# Patient Record
Sex: FEMALE | Race: BLACK OR AFRICAN AMERICAN | NOT HISPANIC OR LATINO | Employment: FULL TIME | URBAN - METROPOLITAN AREA
[De-identification: names, ages, dates, MRNs, and addresses within clinical notes are randomized per-mention and may not be internally consistent; named-entity substitution may affect disease eponyms.]

---

## 2017-07-28 ENCOUNTER — OFFICE VISIT - HEALTHEAST (OUTPATIENT)
Dept: FAMILY MEDICINE | Facility: CLINIC | Age: 27
End: 2017-07-28

## 2017-07-28 ENCOUNTER — COMMUNICATION - HEALTHEAST (OUTPATIENT)
Dept: TELEHEALTH | Facility: CLINIC | Age: 27
End: 2017-07-28

## 2017-07-28 DIAGNOSIS — Z76.89 ENCOUNTER TO ESTABLISH CARE: ICD-10-CM

## 2017-07-28 DIAGNOSIS — E66.3 OVERWEIGHT (BMI 25.0-29.9): ICD-10-CM

## 2017-07-28 DIAGNOSIS — L65.9 HAIR LOSS: ICD-10-CM

## 2017-07-28 DIAGNOSIS — D50.9 IRON DEFICIENCY ANEMIA: ICD-10-CM

## 2017-07-28 DIAGNOSIS — N92.1 MENORRHAGIA WITH IRREGULAR CYCLE: ICD-10-CM

## 2017-07-28 DIAGNOSIS — Z00.00 ANNUAL PHYSICAL EXAM: ICD-10-CM

## 2017-07-28 ASSESSMENT — MIFFLIN-ST. JEOR: SCORE: 1370.27

## 2017-08-01 ENCOUNTER — COMMUNICATION - HEALTHEAST (OUTPATIENT)
Dept: FAMILY MEDICINE | Facility: CLINIC | Age: 27
End: 2017-08-01

## 2017-08-02 ENCOUNTER — COMMUNICATION - HEALTHEAST (OUTPATIENT)
Dept: SCHEDULING | Facility: CLINIC | Age: 27
End: 2017-08-02

## 2017-08-24 ENCOUNTER — COMMUNICATION - HEALTHEAST (OUTPATIENT)
Dept: FAMILY MEDICINE | Facility: CLINIC | Age: 27
End: 2017-08-24

## 2017-08-31 ENCOUNTER — OFFICE VISIT - HEALTHEAST (OUTPATIENT)
Dept: FAMILY MEDICINE | Facility: CLINIC | Age: 27
End: 2017-08-31

## 2017-08-31 DIAGNOSIS — E66.3 OVERWEIGHT (BMI 25.0-29.9): ICD-10-CM

## 2017-08-31 DIAGNOSIS — Z3A.09 9 WEEKS GESTATION OF PREGNANCY: ICD-10-CM

## 2017-08-31 ASSESSMENT — MIFFLIN-ST. JEOR: SCORE: 1381.89

## 2018-02-05 ENCOUNTER — RECORDS - HEALTHEAST (OUTPATIENT)
Dept: ADMINISTRATIVE | Facility: OTHER | Age: 28
End: 2018-02-05

## 2018-02-10 ENCOUNTER — HOSPITAL ENCOUNTER (OUTPATIENT)
Dept: MEDSURG UNIT | Facility: CLINIC | Age: 28
Discharge: HOME | End: 2018-02-10
Attending: OBSTETRICS & GYNECOLOGY | Admitting: OBSTETRICS & GYNECOLOGY

## 2018-02-10 LAB
ALBUMIN UR-MCNC: NEGATIVE MG/DL
APPEARANCE UR: CLEAR
BACTERIA #/AREA URNS HPF: ABNORMAL HPF
BILIRUB UR QL STRIP: NEGATIVE
CLUE CELLS: NORMAL
COLOR UR AUTO: ABNORMAL
FIBRONECTIN FETAL VAG QL: POSITIVE
GLUCOSE UR STRIP-MCNC: NEGATIVE MG/DL
HGB UR QL STRIP: NEGATIVE
KETONES UR STRIP-MCNC: NEGATIVE MG/DL
LEUKOCYTE ESTERASE UR QL STRIP: ABNORMAL
MUCOUS THREADS #/AREA URNS LPF: ABNORMAL LPF
NITRATE UR QL: NEGATIVE
PH UR STRIP: 7 [PH] (ref 4.5–8)
RBC #/AREA URNS AUTO: ABNORMAL HPF
RUPTURE OF FETAL MEMBRANES BY ROM PLUS: POSITIVE
SP GR UR STRIP: 1 (ref 1–1.03)
SQUAMOUS #/AREA URNS AUTO: >100 LPF
TRICHOMONAS, WET PREP: NORMAL
UROBILINOGEN UR STRIP-ACNC: ABNORMAL
WBC #/AREA URNS AUTO: ABNORMAL HPF
YEAST, WET PREP: NORMAL

## 2018-02-10 RX ORDER — SWAB
1 SWAB, NON-MEDICATED MISCELLANEOUS DAILY
Status: SHIPPED | COMMUNITY
Start: 2018-02-10

## 2018-02-10 ASSESSMENT — MIFFLIN-ST. JEOR: SCORE: 1469.78

## 2018-02-11 LAB
ALLERGIC TO PENICILLIN: NO
BACTERIA SPEC CULT: NORMAL
GP B STREP DNA SPEC QL NAA+PROBE: NEGATIVE

## 2018-08-15 ENCOUNTER — COMMUNICATION - HEALTHEAST (OUTPATIENT)
Dept: FAMILY MEDICINE | Facility: CLINIC | Age: 28
End: 2018-08-15

## 2018-08-16 ENCOUNTER — OFFICE VISIT - HEALTHEAST (OUTPATIENT)
Dept: FAMILY MEDICINE | Facility: CLINIC | Age: 28
End: 2018-08-16

## 2018-08-16 ENCOUNTER — COMMUNICATION - HEALTHEAST (OUTPATIENT)
Dept: FAMILY MEDICINE | Facility: CLINIC | Age: 28
End: 2018-08-16

## 2018-08-16 DIAGNOSIS — Z11.1 SCREENING-PULMONARY TB: ICD-10-CM

## 2018-08-16 DIAGNOSIS — Z02.89 ENCOUNTER FOR COMPLETION OF FORM WITH PATIENT: ICD-10-CM

## 2018-08-16 DIAGNOSIS — Z78.9 IMMUNE TO HEPATITIS B: ICD-10-CM

## 2018-08-16 ASSESSMENT — MIFFLIN-ST. JEOR: SCORE: 1448.52

## 2018-08-17 LAB — HBV SURFACE AB SERPL IA-ACNC: POSITIVE M[IU]/ML

## 2018-08-20 LAB
GAMMA INTERFERON BACKGROUND BLD IA-ACNC: 0.14 IU/ML
M TB IFN-G BLD-IMP: NEGATIVE
MITOGEN IGNF BCKGRD COR BLD-ACNC: -0.01 IU/ML
MITOGEN IGNF BCKGRD COR BLD-ACNC: 0.01 IU/ML
QTF INTERPRETATION: NORMAL
QTF MITOGEN - NIL: >10 IU/ML

## 2018-08-21 ENCOUNTER — COMMUNICATION - HEALTHEAST (OUTPATIENT)
Dept: FAMILY MEDICINE | Facility: CLINIC | Age: 28
End: 2018-08-21

## 2021-05-31 VITALS — WEIGHT: 149 LBS | HEIGHT: 64 IN | BODY MASS INDEX: 25.44 KG/M2

## 2021-05-31 VITALS — HEIGHT: 64 IN | BODY MASS INDEX: 25.15 KG/M2 | WEIGHT: 147.31 LBS

## 2021-06-01 VITALS — BODY MASS INDEX: 30.3 KG/M2 | WEIGHT: 171 LBS | HEIGHT: 63 IN

## 2021-06-01 VITALS — WEIGHT: 166.31 LBS | BODY MASS INDEX: 29.47 KG/M2 | HEIGHT: 63 IN

## 2021-06-12 NOTE — PROGRESS NOTES
Office Visit - New Patient   Haley Jacob   26 y.o.  female    Date of visit: 07/28/2017  Physician: Elzbieta Arango CNP     Assessment and Plan   1. Hair loss  Hemoglobin    Thyroid Stimulating Hormone (TSH)    Ferritin    Vitamin D, Total (25-Hydroxy)   2. Annual physical exam     3. Encounter to establish care     4. Menorrhagia with irregular cycle  Hemoglobin   5. Overweight (BMI 25.0-29.9)       Will notify patient of grossly abnormal labs by phone. Patient declined initiating oral birth control to decrease the amount of bleeding she is having at this time. She would like to wait to see what her lab results show. Immunization records found on state web site. Will enter into system. Patient is up to date with pap. Will need pap again in 2019 as he first one was normal. Patient will follow up with me after she reviews lab results and further discussion of oral contraception will be discussed at that time. Continue to monitor hair loss until lab results become available.     The following high BMI interventions were performed this visit: weight monitoring, exercise promotion: strength training and exercise promotion: stretching         Chief Complaint   Chief Complaint   Patient presents with     Alopecia     periods heavy        Patient Profile   Social History     Social History Narrative    Lives with her sister.         Past Medical History   There is no problem list on file for this patient.      Past Surgical History  She has no past surgical history on file.     History of Present Illness   This 26 y.o. old  female who presents with having heavy, irregular periods and hair loss. She is currently not taking any birth control. She is sexually active, however, she is not in a relationship at this time. Her periods have been lasting for 2 weeks at a time so she has only been having 2 weeks between periods. Her flow is heavy throughout the entire period. She has been noticing some  "dizziness with this last period. She has had 2 cycles like this now. She denies pain or vision changes. She noticed she started losing her hair 2 months ago when the heavy bleeding started. She had multiple perms as a child and wore tight elysia on a regular basis after age 18. She decided to shave her head to start the re-growth over and has noticed it is growing in patchy. She states she does have some intermittent burning on her scalp. She denies pain on her scalp. She did sign a release of records today as she is transferring care to this clinic. Her last pap smear was one year ago and had no abnormal findings. She has never had the HPV vaccination. We did discuss that she will time out in age before she could receive all  2 vaccinations.      Review of Systems: A comprehensive review of systems was negative except as noted.     Medications and Allergies   No current outpatient prescriptions on file.     No current facility-administered medications for this visit.      No Known Allergies     Family and Social History   Family History   Problem Relation Age of Onset     No Medical Problems Mother      No Medical Problems Father      No Medical Problems Sister         Social History   Substance Use Topics     Smoking status: Never Smoker     Smokeless tobacco: Never Used     Alcohol use No        Physical Exam   General Appearance:   NAD, A & O x4, pleasant, quiet    /64 (Patient Site: Left Arm, Patient Position: Sitting, Cuff Size: Adult Regular)  Pulse 72  Temp 98.5  F (36.9  C) (Oral)   Resp 16  Ht 5' 3.5\" (1.613 m)  Wt 147 lb 5 oz (66.8 kg)  LMP 06/26/2017 (Exact Date)  BMI 25.69 kg/m2    HEAD, EARS, NOSE, MOUTH, AND THROAT: Head with patchy spots on her scalp where hair is missing. No lesions, ulcerations, flaking of skin or infestation.  NECK: Neck appearance was normal. There were no neck masses and the thyroid was not enlarged.  RESPIRATORY: Breathing pattern was normal and the chest moved " symmetrically.  Lung sounds were normal and there were no abnormal sounds.  CARDIOVASCULAR: Heart rate and rhythm were normal.  S1 and S2 were normal and there were no extra sounds or murmurs. No JVD.  GASTROINTESTINAL: The abdomen was normal in contour.  Bowel sounds were present. Palpation detected no tenderness, mass, or enlarged organs.   SKIN/HAIR/NAILS: Skin color was normal.  There were no skin lesions.          Additional Information       Time: total time spent with the patient was 30 minutes of which >50% was spent in counseling and coordination of care     Elzbieta Arango CNP  Family Nurse Practitioner  [facility]  [phone]

## 2021-06-12 NOTE — PROGRESS NOTES
1. 9 weeks gestation of pregnancy     2. Overweight (BMI 25.0-29.9)         ASSESSMENT/PLAN:     Body Mass Index was not assessed due to pregnancy.    1. 9 weeks gestation of pregnancy    -scheduled for OB appointment with Dr. Ortiz    2. Overweight (BMI 25.0-29.9)    -patient currently pregnant      Follow up with Dr. Ortiz for first scheduled OB appointment on 09/22/2017. Cardiac murmur detected on today's exam. Suspect this is due to her chronic anemia. Denies CP, SOB, palpitation, dizziness or bleeding.     The visit lasted a total of 20 minutes face to face with the patient.  Over 50% of the time spent counseling and educating the patient about the above content.      Elzbieta Arango NP          SUBJECTIVE:  Haley Jacob is a 26 y.o. female who presents for follow-up since her confirmation of pregnancy in the emergency department.  Patient was seen in the emergency department on August 22, 2017 for abdominal pain.  She tells me that she woke up in the middle of the night around 2 AM with severe abdominal pain.  She went to her sister's bedroom and told her to drive her to the ER.  She had never experienced any pain like this prior to her visit.  In the ER, she was informed that she was 7-1/2 weeks pregnant.  Her last menstrual period was on June 26, 2017.  She is currently in a relationship and is planning on keeping the baby.  Her and her boyfriend are excited and nervous about it.  She has not told her sister, parents or coworkers yet that she is pregnant.  She is nervous to tell her father due to cultural expectations.  In her culture, you must be  before having children.  She figures she will start telling people in the next several weeks once she is out of her trip first trimester.  She does suffer from anemia.  I did see her recently prior to her ER visit for ongoing alopecia and low hemoglobin issues.  She was prescribed iron by mouth twice a day for her anemia which she continues to  take.  We did discuss briefly what her first OB visit will entail.  She agrees to go ahead and meet with Dr. Ortiz for her first OB appointment even though her and her boyfriend are looking around at other OB options.  Chief Complaint   Patient presents with     Confirmation     preg confirm lmp: 6/26/2017         There is no problem list on file for this patient.      Current Outpatient Prescriptions   Medication Sig Dispense Refill     ferrous sulfate 325 (65 FE) MG tablet Take 1 tablet (325 mg total) by mouth 2 (two) times a day. 180 tablet 3     No current facility-administered medications for this visit.        History   Smoking Status     Never Smoker   Smokeless Tobacco     Never Used       REVIEW OF SYSTEMS: Denies radiation, diaphoresis, shortness of breath, dizziness, syncope, nausea, palpitations, and associated with activity.       TOBACCO USE:  History   Smoking Status     Never Smoker   Smokeless Tobacco     Never Used     Social History     Social History     Marital status: Single     Spouse name: N/A     Number of children: N/A     Years of education: 16     Occupational History     nursing assistant      Social History Main Topics     Smoking status: Never Smoker     Smokeless tobacco: Never Used     Alcohol use No     Drug use: No     Sexual activity: Yes     Partners: Male     Birth control/ protection: Condom     Other Topics Concern     Not on file     Social History Narrative    Lives with her sister.          OBJECTIVE:            Vitals:    08/31/17 1659   BP: 108/60   Pulse: 68     Weight: 149 lb (67.6 kg)  Wt Readings from Last 3 Encounters:   08/31/17 149 lb (67.6 kg)   08/22/17 147 lb (66.7 kg)   07/28/17 147 lb 5 oz (66.8 kg)     Body mass index is 25.78 kg/(m^2).        Physical Exam:  GENERAL APPEARANCE: A&A, NAD, well hydrated, well nourished  SKIN:  Normal skin turgor, no lesions/rashes   CV: RRR, grade 3/6 systolic murmur auscultated  LUNGS: CTAB, normal respiratory  effort  ABDOMEN: S&NT, no masses, no organomegaly, BS present x4   PSYCHIATRIC;  Mood appropriate, memory intact

## 2021-06-16 NOTE — H&P
"OB HISTORY AND PHYSICAL UPDATE ADMISSION EXAM    Name:  Haley Jacob  YOB: 1990  Medical Record Number: [id number]    History of Present Illness:  28 yo  at 32+5 wks here with painful contractions. Pregnancy complicated by SGA with last growth in 10th %ile.  This was decreased from 19%ile at 20 wks.  Pregnancy otherwise uncomplicated.  Cvx -- on arrival  Estimated Date of Delivery: 18                       EGA 32w5d    OB History    Para Term  AB Living   1        SAB TAB Ectopic Multiple Live Births             # Outcome Date GA Lbr Syed/2nd Weight Sex Delivery Anes PTL Lv   1 Current                 Exam:    /70 (Patient Position: Semi-oliver, Cuff Size: Adult Regular)  Pulse 82  Temp 98.4  F (36.9  C) (Oral)   Resp 16  Ht 5' 3\" (1.6 m)  Wt 171 lb (77.6 kg)  LMP 2017  BMI 30.29 kg/m2    Fetal Heart Rate Category 1  Contractions Q 7 min        HEENT          WNL              Heart              WNL                Lungs             WNL                       Abdomen        WNL                       Extremities     WNL                       Vaginal exam -/-    Assessment: 28 yo  at 32+5 wks, here with likely  labor    Plan:   1) BMZ x 1  2) Start Nifedipine now  3) Given likely  labor, will transfer to [place].  Discussed with perinatologist, Dr. Bates, who agrees with transfer  MD Juliet Miles MD    "

## 2021-06-26 NOTE — PROGRESS NOTES
Progress Notes by Elzbieta Arango NP at 8/16/2018  1:00 PM     Author: Elzbieta Arango NP Service: -- Author Type: Nurse Practitioner    Filed: 8/16/2018  2:22 PM Encounter Date: 8/16/2018 Status: Signed    : Elzbieta Arango NP (Nurse Practitioner)       1. Encounter for completion of form with patient     2. Screening-pulmonary TB  XR Chest 2 Views    QTF-Mycobacterium tuberculosis by QuantiFERON-TB Gold Plus   3. Immune to hepatitis B  Hepatitis B Surface Antibody (Anti-HBs) Vaccine Check         ASSESSMENT/PLAN:     The following high BMI interventions were performed this visit: encouragement to exercise and weight monitoring    1. Encounter for completion of form with patient    -nursing program    2. Screening-pulmonary TB    - XR Chest 2 Views  - QTF-Mycobacterium tuberculosis by QuantiFERON-TB Gold Plus    3. Immunity to hepatitis B    - Hepatitis B Surface Antibody (Anti-HBs) Vaccine Check    Patient Instructions     TB Screening (Skin)   Does this test have other names?  Tuberculin test, TST, Mantoux, PPD (purified protein derivative)  What is this test?  This test finds out if you have been infected with tuberculosis (TB). This is a highly contagious bacterial infection spread through the air. It's possible to have inactive (latent) TB and not feel sick. Or you can have active TB disease with symptoms. People with latent TB are not contagious.  Why do I need this test?  You might need this test if you have recently been exposed to someone who has TB. Or you may need it if your healthcare provider suspects you may have a TB infection.  Symptoms of TB include:    Cough    Fever    Night sweats    Unexplained weight loss    Coughing up blood    Chest pain  TB usually affects the lungs. But it can spread to other parts of your body, including your joints, spine, brain, and kidneys, and cause other symptoms.  You also might have this test if you:    Have HIV or another disease that weakens  "your immune system    Use illegal drugs    Live or work in a place with a higher rate of TB infection. This may be a penitentiary or some nursing homes.    Need to start a medicine or medicines that suppress your immune system    Recently emigrated from areas where TB is more common, such as some Eastern  or [place] countries  If you are a healthcare worker, you might have this test periodically as part of your facility's infection control program. Testing for TB is often part of routine prenatal care.  What other tests might I have along with this test?  If you test positive on a TB skin test, your healthcare provider will probably order a chest X-ray, sputum smear (a test on mucus you cough up), and TB culture. These tests are to find out if you have active or latent TB. A blood screening test is also available for TB, but only one screening test will be recommended by your healthcare provider, based on your case.  What do my test results mean?  Many things may affect your lab test results. These include the method each lab uses to do the test. Even if your test results are different from the normal value, you may not have a problem. To learn what the results mean for you, talk with your healthcare provider.  The test may be interpreted as positive if the skin where you were injected is hard, raised, red, and swollen. But redness alone is not considered a positive reaction.  A positive skin test means it's likely that you were infected with TB bacteria at some point in time. But this does not necessarily mean that you have an active TB infection.  In many cases, if you have a healthy immune system, your body will \"wall off\" the TB bacteria soon after you are infected. You will not go on to develop an active TB infection. You will need more tests to see if you have active or inactive TB.  If you have no reaction, the skin test is considered negative, and you are not likely to have inactive TB or TB " disease.  How is this test done?  Your inner forearm is disinfected and a small amount of fluid called tuberculin is injected into your skin. A [place] is drawn around the injection site with long-lasting ink. You need to return to the testing site after 48 to 72 hours to be examined by a trained healthcare worker.  Does this test pose any risks?  When the needle pricks your arm, you may feel a slight stinging sensation or pain. Afterward, the site may be slightly sore.  You cannot get TB from the skin test.  What might affect my test results?  If you have been vaccinated against tuberculosis with BCG (Bacilli Calmette-Sanaz), you can have a positive reaction to the skin test even if you don't have, or never had, a TB infection.  You might have a false negative test if you are:    Malnourished    On steroid therapy    Taking medicines that can affect your immune system, such as medicine for AIDS or cancer  How do I get ready for this test?  You don't need to prepare for this test.  Be sure your healthcare provider knows about all medicines, herbs, vitamins, and supplements you are taking. This includes medicines that don't need a prescription and any illicit drugs you may use.     7618-6788 [employer]. [address] [place] [address]. All rights reserved. This information is not intended as a substitute for professional medical care. Always follow your healthcare professional's instructions.          There are no discontinued medications.  Return if symptoms worsen or fail to improve.        Elzbieta Arango NP          SUBJECTIVE:  Haley Jacob is a 27 y.o. 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  She did bring paperwork today to be completed.  She is in need of a chest x-ray today due to her history of testing positive with her previous TB screenings.  She denies any persistent cough lasting over 3 weeks, no bloody  sputum, no chest pain with coughing, no recent respiratory illness, currently afebrile.  No weight loss no night sweats.  Lab work and chest x-ray ordered.  Vitals are stable today.   Chief Complaint   Patient presents with   ? Labs Only     PPD test/x-ray  and HEP B titer         There is no problem list on file for this patient.      Current Outpatient Prescriptions   Medication Sig Dispense Refill   ? prenatal vitamin iron-folic acid 27mg-0.8mg (PRENATAL S) 27 mg iron- 800 mcg Tab tablet Take 1 tablet by mouth daily.       No current facility-administered medications for this visit.        History   Smoking Status   ? Never Smoker   Smokeless Tobacco   ? Never Used       REVIEW OF SYSTEMS: Denies fever/chills/sore throat/rhinorrhea/ear pain/swollen glands/shortness of breath/discomfort of chest/abdominal pain/changes in bowel habits/urinary symptoms/rash.      TOBACCO USE:  History   Smoking Status   ? Never Smoker   Smokeless Tobacco   ? Never Used     Social History     Social History   ? Marital status: Single     Spouse name: N/A   ? Number of children: N/A   ? 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 Smoking status: Never Smoker   ? Smokeless tobacco: Never Used   ? Alcohol use No   ? Drug use: No   ? Sexual activity: Yes     Partners: Male     Birth control/ protection: Condom     Other Topics Concern   ? Not on file     Social History Narrative    Lives with her sister.          OBJECTIVE:            Vitals:    08/16/18 1312   BP: 110/70   Pulse: 67   Resp: 14   Temp: 98  F (36.7  C)   SpO2: 98%     Weight: 166 lb 5 oz (75.4 kg)  Wt Readings from Last 3 Encounters:   08/16/18 166 lb 5 oz (75.4 kg)   02/10/18 171 lb (77.6 kg)   08/31/17 149 lb (67.6 kg)     Body mass index is 29.46 kg/(m^2).        Physical Exam:  GENERAL APPEARANCE: A&A, NAD, well hydrated, well nourished  NECK: Supple, without lymphadenopathy, no thyroid mass, no JVD, no  bruit  CV: RRR, no M/G/R   LUNGS: CTAB, normal respiratory effort  ABDOMEN: S&NT, no masses, no organomegaly, BS present x4   SKIN:  Normal skin turgor, no lesions/rashes, warm and dry

## 2021-06-27 ENCOUNTER — HEALTH MAINTENANCE LETTER (OUTPATIENT)
Age: 31
End: 2021-06-27

## 2021-07-03 NOTE — ADDENDUM NOTE
Addendum Note by Elzbieta Arango NP at 8/2/2017 11:59 AM     Author: Elzbieta Arango NP Service: -- Author Type: Nurse Practitioner    Filed: 8/2/2017 11:59 AM Encounter Date: 7/28/2017 Status: Signed    : Elzbieta Arango NP (Nurse Practitioner)    Addended by: ELZBIETA ARANGO on: 8/2/2017 11:59 AM        Modules accepted: Orders

## 2021-07-03 NOTE — ADDENDUM NOTE
Addendum Note by Elzbieta Arango NP at 8/1/2017  5:14 PM     Author: Elzbieta Arango NP Service: -- Author Type: Nurse Practitioner    Filed: 8/1/2017  5:14 PM Encounter Date: 7/28/2017 Status: Signed    : Elzbieta Arango NP (Nurse Practitioner)    Addended by: ELZBIETA ARANGO on: 8/1/2017 05:14 PM        Modules accepted: Orders

## 2021-10-17 ENCOUNTER — HEALTH MAINTENANCE LETTER (OUTPATIENT)
Age: 31
End: 2021-10-17

## 2022-07-24 ENCOUNTER — HEALTH MAINTENANCE LETTER (OUTPATIENT)
Age: 32
End: 2022-07-24

## 2022-10-02 ENCOUNTER — HEALTH MAINTENANCE LETTER (OUTPATIENT)
Age: 32
End: 2022-10-02

## 2023-08-12 ENCOUNTER — HEALTH MAINTENANCE LETTER (OUTPATIENT)
Age: 33
End: 2023-08-12

## 2023-12-22 LAB
HEPATITIS B SURFACE ANTIGEN (EXTERNAL): NEGATIVE
HIV1+2 AB SERPL QL IA: NEGATIVE
RUBELLA ANTIBODY IGG (EXTERNAL): NORMAL

## 2024-06-09 ENCOUNTER — HOSPITAL ENCOUNTER (EMERGENCY)
Facility: CLINIC | Age: 34
Discharge: HOME | End: 2024-06-09
Attending: EMERGENCY MEDICINE | Admitting: EMERGENCY MEDICINE
Payer: COMMERCIAL

## 2024-06-09 VITALS
DIASTOLIC BLOOD PRESSURE: 75 MMHG | WEIGHT: 178.35 LBS | HEIGHT: 63 IN | HEART RATE: 89 BPM | OXYGEN SATURATION: 98 % | RESPIRATION RATE: 16 BRPM | BODY MASS INDEX: 31.6 KG/M2 | TEMPERATURE: 98.2 F | SYSTOLIC BLOOD PRESSURE: 118 MMHG

## 2024-06-09 DIAGNOSIS — R50.9 FEVER, UNSPECIFIED FEVER CAUSE: ICD-10-CM

## 2024-06-09 LAB
ALBUMIN SERPL BCG-MCNC: 3.4 G/DL (ref 3.5–5.2)
ALBUMIN UR-MCNC: 10 MG/DL
ALP SERPL-CCNC: 100 U/L (ref 40–150)
ALT SERPL W P-5'-P-CCNC: 30 U/L (ref 0–50)
ANION GAP SERPL CALCULATED.3IONS-SCNC: 14 MMOL/L (ref 7–15)
APPEARANCE UR: CLEAR
AST SERPL W P-5'-P-CCNC: 23 U/L (ref 0–45)
BACTERIA #/AREA URNS HPF: ABNORMAL /HPF
BASOPHILS # BLD AUTO: 0 10E3/UL (ref 0–0.2)
BASOPHILS NFR BLD AUTO: 0 %
BILIRUB SERPL-MCNC: 0.6 MG/DL
BILIRUB UR QL STRIP: NEGATIVE
BUN SERPL-MCNC: 6.5 MG/DL (ref 6–20)
CALCIUM SERPL-MCNC: 9.5 MG/DL (ref 8.6–10)
CHLORIDE SERPL-SCNC: 103 MMOL/L (ref 98–107)
COLOR UR AUTO: YELLOW
CREAT SERPL-MCNC: 0.74 MG/DL (ref 0.51–0.95)
DEPRECATED HCO3 PLAS-SCNC: 18 MMOL/L (ref 22–29)
EGFRCR SERPLBLD CKD-EPI 2021: >90 ML/MIN/1.73M2
EOSINOPHIL # BLD AUTO: 0.1 10E3/UL (ref 0–0.7)
EOSINOPHIL NFR BLD AUTO: 1 %
ERYTHROCYTE [DISTWIDTH] IN BLOOD BY AUTOMATED COUNT: 14.9 % (ref 10–15)
FLUAV RNA SPEC QL NAA+PROBE: NEGATIVE
FLUBV RNA RESP QL NAA+PROBE: NEGATIVE
GLUCOSE SERPL-MCNC: 83 MG/DL (ref 70–99)
GLUCOSE UR STRIP-MCNC: NEGATIVE MG/DL
GROUP A STREP BY PCR: NOT DETECTED
HCT VFR BLD AUTO: 36.6 % (ref 35–47)
HGB BLD-MCNC: 12 G/DL (ref 11.7–15.7)
HGB UR QL STRIP: NEGATIVE
IMM GRANULOCYTES # BLD: 0.2 10E3/UL
IMM GRANULOCYTES NFR BLD: 2 %
KETONES UR STRIP-MCNC: 60 MG/DL
LACTATE SERPL-SCNC: 0.9 MMOL/L (ref 0.7–2)
LEUKOCYTE ESTERASE UR QL STRIP: ABNORMAL
LIPASE SERPL-CCNC: 30 U/L (ref 13–60)
LYMPHOCYTES # BLD AUTO: 0.7 10E3/UL (ref 0.8–5.3)
LYMPHOCYTES NFR BLD AUTO: 7 %
MCH RBC QN AUTO: 26.3 PG (ref 26.5–33)
MCHC RBC AUTO-ENTMCNC: 32.8 G/DL (ref 31.5–36.5)
MCV RBC AUTO: 80 FL (ref 78–100)
MONOCYTES # BLD AUTO: 1 10E3/UL (ref 0–1.3)
MONOCYTES NFR BLD AUTO: 11 %
MUCOUS THREADS #/AREA URNS LPF: PRESENT /LPF
NEUTROPHILS # BLD AUTO: 7.3 10E3/UL (ref 1.6–8.3)
NEUTROPHILS NFR BLD AUTO: 79 %
NITRATE UR QL: NEGATIVE
NRBC # BLD AUTO: 0 10E3/UL
NRBC BLD AUTO-RTO: 0 /100
PH UR STRIP: 6.5 [PH] (ref 5–7)
PLATELET # BLD AUTO: 160 10E3/UL (ref 150–450)
POTASSIUM SERPL-SCNC: 3.7 MMOL/L (ref 3.4–5.3)
PROT SERPL-MCNC: 6.5 G/DL (ref 6.4–8.3)
RBC # BLD AUTO: 4.57 10E6/UL (ref 3.8–5.2)
RBC URINE: 1 /HPF
RSV RNA SPEC NAA+PROBE: NEGATIVE
SARS-COV-2 RNA RESP QL NAA+PROBE: NEGATIVE
SODIUM SERPL-SCNC: 135 MMOL/L (ref 135–145)
SP GR UR STRIP: 1.02 (ref 1–1.03)
SQUAMOUS EPITHELIAL: 4 /HPF
UROBILINOGEN UR STRIP-MCNC: NORMAL MG/DL
WBC # BLD AUTO: 9.2 10E3/UL (ref 4–11)
WBC URINE: 4 /HPF

## 2024-06-09 PROCEDURE — 87637 SARSCOV2&INF A&B&RSV AMP PRB: CPT | Performed by: EMERGENCY MEDICINE

## 2024-06-09 PROCEDURE — 36415 COLL VENOUS BLD VENIPUNCTURE: CPT | Performed by: EMERGENCY MEDICINE

## 2024-06-09 PROCEDURE — 99283 EMERGENCY DEPT VISIT LOW MDM: CPT

## 2024-06-09 PROCEDURE — 83605 ASSAY OF LACTIC ACID: CPT | Performed by: EMERGENCY MEDICINE

## 2024-06-09 PROCEDURE — 87651 STREP A DNA AMP PROBE: CPT | Performed by: EMERGENCY MEDICINE

## 2024-06-09 PROCEDURE — 80053 COMPREHEN METABOLIC PANEL: CPT | Performed by: EMERGENCY MEDICINE

## 2024-06-09 PROCEDURE — 85025 COMPLETE CBC W/AUTO DIFF WBC: CPT | Performed by: EMERGENCY MEDICINE

## 2024-06-09 PROCEDURE — 87040 BLOOD CULTURE FOR BACTERIA: CPT | Performed by: EMERGENCY MEDICINE

## 2024-06-09 PROCEDURE — 81001 URINALYSIS AUTO W/SCOPE: CPT | Performed by: EMERGENCY MEDICINE

## 2024-06-09 PROCEDURE — 250N000013 HC RX MED GY IP 250 OP 250 PS 637: Performed by: EMERGENCY MEDICINE

## 2024-06-09 PROCEDURE — 83690 ASSAY OF LIPASE: CPT | Performed by: EMERGENCY MEDICINE

## 2024-06-09 RX ORDER — ACETAMINOPHEN 500 MG
1000 TABLET ORAL ONCE
Status: COMPLETED | OUTPATIENT
Start: 2024-06-09 | End: 2024-06-09

## 2024-06-09 RX ADMIN — ACETAMINOPHEN 1000 MG: 500 TABLET, FILM COATED ORAL at 20:43

## 2024-06-09 ASSESSMENT — COLUMBIA-SUICIDE SEVERITY RATING SCALE - C-SSRS
6. HAVE YOU EVER DONE ANYTHING, STARTED TO DO ANYTHING, OR PREPARED TO DO ANYTHING TO END YOUR LIFE?: NO
1. IN THE PAST MONTH, HAVE YOU WISHED YOU WERE DEAD OR WISHED YOU COULD GO TO SLEEP AND NOT WAKE UP?: NO
2. HAVE YOU ACTUALLY HAD ANY THOUGHTS OF KILLING YOURSELF IN THE PAST MONTH?: NO

## 2024-06-09 ASSESSMENT — ACTIVITIES OF DAILY LIVING (ADL)
ADLS_ACUITY_SCORE: 33
ADLS_ACUITY_SCORE: 35
ADLS_ACUITY_SCORE: 33

## 2024-06-10 LAB — GROUP B STREPTOCOCCUS (EXTERNAL): NEGATIVE

## 2024-06-10 NOTE — ED PROVIDER NOTES
"  Emergency Department Note      History of Present Illness     Chief Complaint  Fever    HPI  Haley Jacob is a 33 year old female who is  and is currently 35 weeks gestation presents to the emergency department for a fever. The patient states that she is currently  and is 35 weeks gestation. She denies any complications regarding this current pregnancy. She reports that today at 1400, she began experiencing an onset of fever, cough, pharyngitis, and 1 episode of vomiting. Denies headache or neck pain. Denies abdominal pain or back pain. Denies vaginal bleeding or vaginal discharge. Denies dysuria or urinary frequency. Denies rash. Denies any insect bites. She declines wanting a chest Xray today and notes that she has an OB/GYN appointment tomorrow.    Independent Historian  None    Past Medical History   Medical History and Problem List  No past pertinent medical history.    Medications  The patient is currently on no regular medications.    Physical Exam   Patient Vitals for the past 24 hrs:   BP Temp Temp src Pulse Resp SpO2 Height Weight   24 2349 118/75 -- -- 89 16 98 % -- --   24 2209 116/66 98.2  F (36.8  C) Oral 98 20 96 % -- --   24 2039 137/68 99.6  F (37.6  C) Oral (!) 125 20 98 % 1.6 m (5' 3\") 80.9 kg (178 lb 5.6 oz)     Physical Exam  General: Patient is awake, alert  Head: The scalp, face, and head appear normal  Eyes: The pupils are equal, round, and reactive to light. Conjunctivae and sclerae are normal  ENT: External acoustic canals are normal. The oropharynx is normal without erythema. Uvula is in the midline  Neck: Normal range of motion.  No meningismus.  CV: Regular rate and rhythm.   Resp: Lungs are clear without wheezes or rales. No respiratory distress.   GI: gravid but Abdomen is soft, no rigidity, guarding, or rebound. No distension. No tenderness to palpation in any quadrant.     MS: Normal tone. Joints grossly normal without effusions. No asymmetric leg " swelling, calf or thigh tenderness.    Skin: No rash or lesions noted. Normal capillary refill noted  Neuro: Speech is normal and fluent. Face is symmetric. Moving all extremities.   Psych:  Normal affect.  Appropriate interactions.    Diagnostics   Lab Results   Labs Ordered and Resulted from Time of ED Arrival to Time of ED Departure   COMPREHENSIVE METABOLIC PANEL - Abnormal       Result Value    Sodium 135      Potassium 3.7      Carbon Dioxide (CO2) 18 (*)     Anion Gap 14      Urea Nitrogen 6.5      Creatinine 0.74      GFR Estimate >90      Calcium 9.5      Chloride 103      Glucose 83      Alkaline Phosphatase 100      AST 23      ALT 30      Protein Total 6.5      Albumin 3.4 (*)     Bilirubin Total 0.6     ROUTINE UA WITH MICROSCOPIC REFLEX TO CULTURE - Abnormal    Color Urine Yellow      Appearance Urine Clear      Glucose Urine Negative      Bilirubin Urine Negative      Ketones Urine 60 (*)     Specific Gravity Urine 1.018      Blood Urine Negative      pH Urine 6.5      Protein Albumin Urine 10 (*)     Urobilinogen Urine Normal      Nitrite Urine Negative      Leukocyte Esterase Urine Small (*)     Bacteria Urine Few (*)     Mucus Urine Present (*)     RBC Urine 1      WBC Urine 4      Squamous Epithelials Urine 4 (*)    CBC WITH PLATELETS AND DIFFERENTIAL - Abnormal    WBC Count 9.2      RBC Count 4.57      Hemoglobin 12.0      Hematocrit 36.6      MCV 80      MCH 26.3 (*)     MCHC 32.8      RDW 14.9      Platelet Count 160      % Neutrophils 79      % Lymphocytes 7      % Monocytes 11      % Eosinophils 1      % Basophils 0      % Immature Granulocytes 2      NRBCs per 100 WBC 0      Absolute Neutrophils 7.3      Absolute Lymphocytes 0.7 (*)     Absolute Monocytes 1.0      Absolute Eosinophils 0.1      Absolute Basophils 0.0      Absolute Immature Granulocytes 0.2      Absolute NRBCs 0.0     INFLUENZA A/B, RSV, & SARS-COV2 PCR - Normal    Influenza A PCR Negative      Influenza B PCR Negative       RSV PCR Negative      SARS CoV2 PCR Negative     LIPASE - Normal    Lipase 30     LACTIC ACID WHOLE BLOOD WITH 1X REPEAT IN 2 HR WHEN >2 - Normal    Lactic Acid, Initial 0.9     GROUP A STREPTOCOCCUS PCR THROAT SWAB - Normal    Group A strep by PCR Not Detected     BLOOD CULTURE   BLOOD CULTURE     Imaging  None    Independent Interpretation  None  ED Course    Medications Administered  Medications   acetaminophen (TYLENOL) tablet 1,000 mg (1,000 mg Oral $Given 6/9/24 2043)     Discussion of Management  None    Social Determinants of Health adding to complexity of care  None    ED Course  ED Course as of 06/09/24 2352   Sun Jun 09, 2024   2231 I obtained history and examined the patient as noted above.      2332 I discussed findings and discharge with the patient. All questions answered.        Medical Decision Making / Diagnosis   CMS Diagnoses: None    MIPS  None    [facility]  Haley Jacob is a 33 year old female who is approximately 35 weeks pregnant who presents to the emergency department with fever.  On initial evaluation she is tachycardic but otherwise hemodynamically stable with no vital signs.  She is afebrile and oxygenating well on room air.  On initial evaluation, she does not appear in significant distress.  She has a gravid but nontender abdomen.  Broad workup was initiated to investigate possible infectious pathology.  COVID, influenza, RSV negative.  Strep testing negative.  Urine not indicative of infection.  No significant abdominal pain or tenderness.  We discussed performing a chest x-ray to rule out pneumonia but she politely declined.  Given that she has no hypoxia and clear lung sounds bilaterally I feel that the risk of pneumonia is low but not zero.  This was discussed with the patient.  We also discussed prophylactic antibiotics which she politely declined.  She will follow-up with her OB tomorrow for close follow-up.  Blood cultures were obtained and are currently pending.  We discussed  reasons to return to the emergency department.  All questions were answered patient be discharged home in stable condition.  No clear infectious source was identified during her evaluation.  Viral infection is certainly possible.  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    Disposition  The patient was discharged.     ICD-10 Codes:    ICD-10-CM    1. Fever, unspecified fever cause  R50.9          Scribe Disclosure:  I, Colby Pritchett, am serving as a scribe at 10:41 PM on 6/9/2024 to document services personally performed by Kevin Howell MDbased on my observations and the provider's statements to me.        Kevin Howell MD  06/10/24 0222

## 2024-06-10 NOTE — ED TRIAGE NOTES
Fever, cough and chills started at 1400.  Pt is 35 weeks pregnant. Pt denies abd pain/cramping, no vaginal bleeding or discharge

## 2024-06-15 LAB
BACTERIA BLD CULT: NO GROWTH
BACTERIA BLD CULT: NO GROWTH

## 2024-07-14 ENCOUNTER — HOSPITAL ENCOUNTER (INPATIENT)
Facility: CLINIC | Age: 34
LOS: 2 days | Discharge: HOME | DRG: 769 | End: 2024-07-16
Attending: OBSTETRICS & GYNECOLOGY | Admitting: OBSTETRICS & GYNECOLOGY
Payer: COMMERCIAL

## 2024-07-14 DIAGNOSIS — O09.293 H/O PRECIPITOUS LABOR AND DELIVERIES, ANTEPARTUM, THIRD TRIMESTER: ICD-10-CM

## 2024-07-14 LAB
ABO/RH(D): NORMAL
ANTIBODY SCREEN: NEGATIVE
ERYTHROCYTE [DISTWIDTH] IN BLOOD BY AUTOMATED COUNT: 15.3 % (ref 10–15)
HCT VFR BLD AUTO: 39.8 % (ref 35–47)
HGB BLD-MCNC: 12.7 G/DL (ref 11.7–15.7)
MCH RBC QN AUTO: 25.1 PG (ref 26.5–33)
MCHC RBC AUTO-ENTMCNC: 31.9 G/DL (ref 31.5–36.5)
MCV RBC AUTO: 79 FL (ref 78–100)
PLATELET # BLD AUTO: 185 10E3/UL (ref 150–450)
RBC # BLD AUTO: 5.05 10E6/UL (ref 3.8–5.2)
SPECIMEN EXPIRATION DATE: NORMAL
WBC # BLD AUTO: 8.9 10E3/UL (ref 4–11)

## 2024-07-14 PROCEDURE — 250N000013 HC RX MED GY IP 250 OP 250 PS 637: Performed by: OBSTETRICS & GYNECOLOGY

## 2024-07-14 PROCEDURE — 250N000011 HC RX IP 250 OP 636: Performed by: OBSTETRICS & GYNECOLOGY

## 2024-07-14 PROCEDURE — 120N000001 HC R&B MED SURG/OB

## 2024-07-14 PROCEDURE — 250N000009 HC RX 250: Performed by: OBSTETRICS & GYNECOLOGY

## 2024-07-14 PROCEDURE — 0KQM0ZZ REPAIR PERINEUM MUSCLE, OPEN APPROACH: ICD-10-PCS | Performed by: OBSTETRICS & GYNECOLOGY

## 2024-07-14 PROCEDURE — 86900 BLOOD TYPING SEROLOGIC ABO: CPT | Performed by: OBSTETRICS & GYNECOLOGY

## 2024-07-14 PROCEDURE — 36415 COLL VENOUS BLD VENIPUNCTURE: CPT | Performed by: OBSTETRICS & GYNECOLOGY

## 2024-07-14 PROCEDURE — 86780 TREPONEMA PALLIDUM: CPT | Performed by: OBSTETRICS & GYNECOLOGY

## 2024-07-14 PROCEDURE — 59414 DELIVER PLACENTA: CPT

## 2024-07-14 PROCEDURE — 85027 COMPLETE CBC AUTOMATED: CPT | Performed by: OBSTETRICS & GYNECOLOGY

## 2024-07-14 RX ORDER — KETOROLAC TROMETHAMINE 30 MG/ML
30 INJECTION, SOLUTION INTRAMUSCULAR; INTRAVENOUS
Status: COMPLETED | OUTPATIENT
Start: 2024-07-14 | End: 2024-07-14

## 2024-07-14 RX ORDER — METHYLERGONOVINE MALEATE 0.2 MG/ML
200 INJECTION INTRAVENOUS
Status: DISCONTINUED | OUTPATIENT
Start: 2024-07-14 | End: 2024-07-16 | Stop reason: HOSPADM

## 2024-07-14 RX ORDER — OXYTOCIN 10 [USP'U]/ML
10 INJECTION, SOLUTION INTRAMUSCULAR; INTRAVENOUS
Status: DISCONTINUED | OUTPATIENT
Start: 2024-07-14 | End: 2024-07-16 | Stop reason: HOSPADM

## 2024-07-14 RX ORDER — NALOXONE HYDROCHLORIDE 0.4 MG/ML
0.2 INJECTION, SOLUTION INTRAMUSCULAR; INTRAVENOUS; SUBCUTANEOUS
Status: DISCONTINUED | OUTPATIENT
Start: 2024-07-14 | End: 2024-07-14 | Stop reason: HOSPADM

## 2024-07-14 RX ORDER — LOPERAMIDE HCL 2 MG
4 CAPSULE ORAL
Status: DISCONTINUED | OUTPATIENT
Start: 2024-07-14 | End: 2024-07-16 | Stop reason: HOSPADM

## 2024-07-14 RX ORDER — LOPERAMIDE HCL 2 MG
2 CAPSULE ORAL
Status: DISCONTINUED | OUTPATIENT
Start: 2024-07-14 | End: 2024-07-16 | Stop reason: HOSPADM

## 2024-07-14 RX ORDER — TRANEXAMIC ACID 10 MG/ML
1 INJECTION, SOLUTION INTRAVENOUS EVERY 30 MIN PRN
Status: DISCONTINUED | OUTPATIENT
Start: 2024-07-14 | End: 2024-07-14 | Stop reason: HOSPADM

## 2024-07-14 RX ORDER — OXYTOCIN/0.9 % SODIUM CHLORIDE 30/500 ML
100-340 PLASTIC BAG, INJECTION (ML) INTRAVENOUS CONTINUOUS PRN
Status: DISCONTINUED | OUTPATIENT
Start: 2024-07-14 | End: 2024-07-16 | Stop reason: HOSPADM

## 2024-07-14 RX ORDER — MISOPROSTOL 200 UG/1
400 TABLET ORAL
Status: DISCONTINUED | OUTPATIENT
Start: 2024-07-14 | End: 2024-07-16 | Stop reason: HOSPADM

## 2024-07-14 RX ORDER — MISOPROSTOL 200 UG/1
800 TABLET ORAL
Status: DISCONTINUED | OUTPATIENT
Start: 2024-07-14 | End: 2024-07-14 | Stop reason: HOSPADM

## 2024-07-14 RX ORDER — CITRIC ACID/SODIUM CITRATE 334-500MG
30 SOLUTION, ORAL ORAL
Status: DISCONTINUED | OUTPATIENT
Start: 2024-07-14 | End: 2024-07-14 | Stop reason: HOSPADM

## 2024-07-14 RX ORDER — CARBOPROST TROMETHAMINE 250 UG/ML
250 INJECTION, SOLUTION INTRAMUSCULAR
Status: DISCONTINUED | OUTPATIENT
Start: 2024-07-14 | End: 2024-07-16 | Stop reason: HOSPADM

## 2024-07-14 RX ORDER — FENTANYL CITRATE 50 UG/ML
100 INJECTION, SOLUTION INTRAMUSCULAR; INTRAVENOUS
Status: DISCONTINUED | OUTPATIENT
Start: 2024-07-14 | End: 2024-07-14 | Stop reason: HOSPADM

## 2024-07-14 RX ORDER — OXYTOCIN 10 [USP'U]/ML
10 INJECTION, SOLUTION INTRAMUSCULAR; INTRAVENOUS
Status: DISCONTINUED | OUTPATIENT
Start: 2024-07-14 | End: 2024-07-14 | Stop reason: HOSPADM

## 2024-07-14 RX ORDER — OXYTOCIN 10 [USP'U]/ML
10 INJECTION, SOLUTION INTRAMUSCULAR; INTRAVENOUS
Status: COMPLETED | OUTPATIENT
Start: 2024-07-14 | End: 2024-07-14

## 2024-07-14 RX ORDER — MODIFIED LANOLIN
OINTMENT (GRAM) TOPICAL
Status: DISCONTINUED | OUTPATIENT
Start: 2024-07-14 | End: 2024-07-16 | Stop reason: HOSPADM

## 2024-07-14 RX ORDER — METOCLOPRAMIDE HYDROCHLORIDE 5 MG/ML
10 INJECTION INTRAMUSCULAR; INTRAVENOUS EVERY 6 HOURS PRN
Status: DISCONTINUED | OUTPATIENT
Start: 2024-07-14 | End: 2024-07-14 | Stop reason: HOSPADM

## 2024-07-14 RX ORDER — ACETAMINOPHEN 325 MG/1
650 TABLET ORAL EVERY 4 HOURS PRN
Status: DISCONTINUED | OUTPATIENT
Start: 2024-07-14 | End: 2024-07-16 | Stop reason: HOSPADM

## 2024-07-14 RX ORDER — LOPERAMIDE HCL 2 MG
2 CAPSULE ORAL
Status: DISCONTINUED | OUTPATIENT
Start: 2024-07-14 | End: 2024-07-14 | Stop reason: HOSPADM

## 2024-07-14 RX ORDER — ONDANSETRON 2 MG/ML
4 INJECTION INTRAMUSCULAR; INTRAVENOUS EVERY 6 HOURS PRN
Status: DISCONTINUED | OUTPATIENT
Start: 2024-07-14 | End: 2024-07-14 | Stop reason: HOSPADM

## 2024-07-14 RX ORDER — TRANEXAMIC ACID 10 MG/ML
1 INJECTION, SOLUTION INTRAVENOUS EVERY 30 MIN PRN
Status: DISCONTINUED | OUTPATIENT
Start: 2024-07-14 | End: 2024-07-16 | Stop reason: HOSPADM

## 2024-07-14 RX ORDER — MISOPROSTOL 200 UG/1
800 TABLET ORAL
Status: DISCONTINUED | OUTPATIENT
Start: 2024-07-14 | End: 2024-07-16 | Stop reason: HOSPADM

## 2024-07-14 RX ORDER — METHYLERGONOVINE MALEATE 0.2 MG/ML
200 INJECTION INTRAVENOUS
Status: DISCONTINUED | OUTPATIENT
Start: 2024-07-14 | End: 2024-07-14 | Stop reason: HOSPADM

## 2024-07-14 RX ORDER — OXYTOCIN/0.9 % SODIUM CHLORIDE 30/500 ML
340 PLASTIC BAG, INJECTION (ML) INTRAVENOUS CONTINUOUS PRN
Status: DISCONTINUED | OUTPATIENT
Start: 2024-07-14 | End: 2024-07-16 | Stop reason: HOSPADM

## 2024-07-14 RX ORDER — NALOXONE HYDROCHLORIDE 0.4 MG/ML
0.4 INJECTION, SOLUTION INTRAMUSCULAR; INTRAVENOUS; SUBCUTANEOUS
Status: DISCONTINUED | OUTPATIENT
Start: 2024-07-14 | End: 2024-07-14 | Stop reason: HOSPADM

## 2024-07-14 RX ORDER — IBUPROFEN 800 MG/1
800 TABLET, FILM COATED ORAL
Status: COMPLETED | OUTPATIENT
Start: 2024-07-14 | End: 2024-07-14

## 2024-07-14 RX ORDER — LOPERAMIDE HCL 2 MG
4 CAPSULE ORAL
Status: DISCONTINUED | OUTPATIENT
Start: 2024-07-14 | End: 2024-07-14 | Stop reason: HOSPADM

## 2024-07-14 RX ORDER — MISOPROSTOL 200 UG/1
400 TABLET ORAL
Status: DISCONTINUED | OUTPATIENT
Start: 2024-07-14 | End: 2024-07-14 | Stop reason: HOSPADM

## 2024-07-14 RX ORDER — OXYTOCIN/0.9 % SODIUM CHLORIDE 30/500 ML
340 PLASTIC BAG, INJECTION (ML) INTRAVENOUS CONTINUOUS PRN
Status: DISCONTINUED | OUTPATIENT
Start: 2024-07-14 | End: 2024-07-14 | Stop reason: HOSPADM

## 2024-07-14 RX ORDER — METOCLOPRAMIDE 10 MG/1
10 TABLET ORAL EVERY 6 HOURS PRN
Status: DISCONTINUED | OUTPATIENT
Start: 2024-07-14 | End: 2024-07-14 | Stop reason: HOSPADM

## 2024-07-14 RX ORDER — ONDANSETRON 4 MG/1
4 TABLET, ORALLY DISINTEGRATING ORAL EVERY 6 HOURS PRN
Status: DISCONTINUED | OUTPATIENT
Start: 2024-07-14 | End: 2024-07-14 | Stop reason: HOSPADM

## 2024-07-14 RX ORDER — PROCHLORPERAZINE MALEATE 10 MG
10 TABLET ORAL EVERY 6 HOURS PRN
Status: DISCONTINUED | OUTPATIENT
Start: 2024-07-14 | End: 2024-07-14 | Stop reason: HOSPADM

## 2024-07-14 RX ORDER — IBUPROFEN 800 MG/1
800 TABLET, FILM COATED ORAL EVERY 6 HOURS PRN
Status: DISCONTINUED | OUTPATIENT
Start: 2024-07-14 | End: 2024-07-16 | Stop reason: HOSPADM

## 2024-07-14 RX ORDER — PROCHLORPERAZINE 25 MG
25 SUPPOSITORY, RECTAL RECTAL EVERY 12 HOURS PRN
Status: DISCONTINUED | OUTPATIENT
Start: 2024-07-14 | End: 2024-07-14 | Stop reason: HOSPADM

## 2024-07-14 RX ORDER — CARBOPROST TROMETHAMINE 250 UG/ML
250 INJECTION, SOLUTION INTRAMUSCULAR
Status: DISCONTINUED | OUTPATIENT
Start: 2024-07-14 | End: 2024-07-14 | Stop reason: HOSPADM

## 2024-07-14 RX ORDER — DOCUSATE SODIUM 100 MG/1
100 CAPSULE, LIQUID FILLED ORAL DAILY
Status: DISCONTINUED | OUTPATIENT
Start: 2024-07-14 | End: 2024-07-16 | Stop reason: HOSPADM

## 2024-07-14 RX ORDER — LIDOCAINE HYDROCHLORIDE 10 MG/ML
INJECTION, SOLUTION EPIDURAL; INFILTRATION; INTRACAUDAL; PERINEURAL
Status: COMPLETED
Start: 2024-07-14 | End: 2024-07-14

## 2024-07-14 RX ORDER — BISACODYL 10 MG
10 SUPPOSITORY, RECTAL RECTAL DAILY PRN
Status: DISCONTINUED | OUTPATIENT
Start: 2024-07-14 | End: 2024-07-16 | Stop reason: HOSPADM

## 2024-07-14 RX ORDER — HYDROCORTISONE 25 MG/G
CREAM TOPICAL 3 TIMES DAILY PRN
Status: DISCONTINUED | OUTPATIENT
Start: 2024-07-14 | End: 2024-07-16 | Stop reason: HOSPADM

## 2024-07-14 RX ADMIN — OXYTOCIN 10 UNITS: 10 INJECTION, SOLUTION INTRAMUSCULAR; INTRAVENOUS at 12:28

## 2024-07-14 RX ADMIN — ACETAMINOPHEN 650 MG: 325 TABLET, FILM COATED ORAL at 16:27

## 2024-07-14 RX ADMIN — DOCUSATE SODIUM 100 MG: 100 CAPSULE, LIQUID FILLED ORAL at 16:27

## 2024-07-14 RX ADMIN — LIDOCAINE HYDROCHLORIDE 5 ML: 10 INJECTION, SOLUTION EPIDURAL; INFILTRATION; INTRACAUDAL; PERINEURAL at 12:40

## 2024-07-14 RX ADMIN — IBUPROFEN 800 MG: 800 TABLET ORAL at 20:21

## 2024-07-14 RX ADMIN — IBUPROFEN 800 MG: 800 TABLET, FILM COATED ORAL at 13:53

## 2024-07-14 ASSESSMENT — ACTIVITIES OF DAILY LIVING (ADL)
ADLS_ACUITY_SCORE: 20
ADLS_ACUITY_SCORE: 20
ADLS_ACUITY_SCORE: 35
ADLS_ACUITY_SCORE: 20

## 2024-07-14 NOTE — PROVIDER NOTIFICATION
07/14/24 1225   Provider Notification   Provider Name/Title Dr. Ann   Method of Notification At Bedside   Request Evaluate in Person   Notification Reason Patient Arrived     MD at bedside. Patient had spontaneous vaginal delivery in her car at 1155, see admission note. MD here to deliver the placenta and assess for laceration. Primary OB Dr. Thakkar updated on patient arrival, Dr. Ann to complete repair, Ariane not needed at bedside at this time.

## 2024-07-14 NOTE — PLAN OF CARE
Goal Outcome Evaluation:      Plan of Care Reviewed With: patient, spouse    Overall Patient Progress: improvingOverall Patient Progress: improving     Took over care at 1500. Vitals stable. Postpartum checks within normal limits. Ambulating independently. Has voided one time. Pain controlled with tylenol and ibuprofen. Breastfeeding every 2-3 hours. Latch observed this afternoon, baby had wide latch with flanged lips and some swallows heard. Educated parents on what a good latch looks like and what to look for/listen for with swallows. Spouse and family at bedside and attentive to mom and baby. Bonding well with baby, frequent holding observed.     Problem: Adult Inpatient Plan of Care  Goal: Plan of Care Review  Outcome: Progressing  Flowsheets (Taken 7/14/2024 1838)  Plan of Care Reviewed With:   patient   spouse  Overall Patient Progress: improving  Goal: Patient-Specific Goal (Individualized)  Outcome: Progressing  Goal: Absence of Hospital-Acquired Illness or Injury  Outcome: Progressing  Intervention: Prevent Skin Injury  Recent Flowsheet Documentation  Taken 7/14/2024 1623 by Vandana Aguilar, RN  Body Position: position changed independently  Intervention: Prevent Infection  Recent Flowsheet Documentation  Taken 7/14/2024 1623 by Vandana Aguilar, RN  Infection Prevention:   rest/sleep promoted   single patient room provided   hand hygiene promoted  Goal: Optimal Comfort and Wellbeing  Outcome: Progressing  Intervention: Monitor Pain and Promote Comfort  Recent Flowsheet Documentation  Taken 7/14/2024 1627 by Vandana Aguilar, RN  Pain Management Interventions: medication (see MAR)  Intervention: Provide Person-Centered Care  Recent Flowsheet Documentation  Taken 7/14/2024 1623 by Vandana Aguilar, RN  Trust Relationship/Rapport:   care explained   choices provided   emotional support provided   empathic listening provided   questions answered   thoughts/feelings acknowledged   reassurance  provided   questions encouraged  Goal: Readiness for Transition of Care  Outcome: Progressing     Problem: Postpartum (Vaginal Delivery)  Goal: Successful Parent Role Transition  Outcome: Progressing  Intervention: Support Parent Role Transition  Recent Flowsheet Documentation  Taken 7/14/2024 1623 by Vadnana Aguilar, RN  Supportive Measures: active listening utilized  Parent-Child Attachment Promotion:   caring behavior modeled   interaction encouraged   parent/caregiver presence encouraged   skin-to-skin contact encouraged   rooming-in promoted  Goal: Hemostasis  Outcome: Progressing  Goal: Absence of Infection Signs and Symptoms  Outcome: Progressing  Goal: Anesthesia/Sedation Recovery  Outcome: Progressing  Goal: Optimal Pain Control and Function  Outcome: Progressing  Intervention: Prevent or Manage Pain  Recent Flowsheet Documentation  Taken 7/14/2024 1627 by Vandana Aguilar, RN  Pain Management Interventions: medication (see MAR)  Taken 7/14/2024 1623 by Vandana Aguilar, RN  Perineal Care:   absorbent brief/pad changed   perineum cleansed  Goal: Effective Urinary Elimination  Outcome: Progressing     Problem: Skin Injury Risk Increased  Goal: Skin Health and Integrity  Outcome: Progressing  Intervention: Optimize Skin Protection  Recent Flowsheet Documentation  Taken 7/14/2024 1623 by Vandana Aguilar, RN  Activity Management:   ambulated in room   ambulated to bathroom   activity adjusted per tolerance   activity encouraged  Head of Bed (HOB) Positioning: HOB at 30 degrees

## 2024-07-14 NOTE — CARE PLAN
Data: [facility] presented [facility]: 2024 12:25 PM following spontaneous vaginal delivery of infant male born at 1155. Patient reports contractions started this morning at 1100 shortly after she woke up. Membranes spontaneously ruptured at 1150, clear fluid. The patient got in her car with her partner and her mother, infant delivered spontaneously in their car at 1155. Patient's spouse drove them to the hospital where they presented to the ED. Patient was brought to the unit via cart with baby on her chest.  Patient is a .  Prenatal record reviewed. Pregnancy  has been uncomplicated She sees the midwives at [facility]. Will be admitted under [facility] provider, Dr. Thakkar.  Gestational Age 40w3d. Support person is present.   Action: Verbal consent for treatment. In-House OB Dr. Ann notified of patient arrival, arrived at bedside for delivery of placenta.  Response: Patient verbalized agreement with plan. Will contact Dr Meghana Thakkar with update and further orders.

## 2024-07-14 NOTE — L&D DELIVERY NOTE
Haley Jacob is a 33 year old now p 102 who at 40 3/7 weeks experienced precipitous labor and delivery, delivered outside of Emergency department.  I was called as emergency in-house obstetrician.    Pt transferred to delivery bed.  Umbilical cord clamped and cut to free  male, 7# 4 oz, apgars  unassigned as baby >5 minutes old, but appears well, vigorous.     Intact normal placenta delivered spontaneously.  Long cord.     Second degree laceration repaired after local infiltration, with 3-0 vicryl.  Very small right periurethral laceration not repaired.   ml.  Given IM pitocin.  Counts correct.    Nae Ann MD on 2024 at 1:38 PM

## 2024-07-14 NOTE — H&P
Labor and Delivery H&P, delivery note    Haley Jacob is a 33 year old  at 40 3/7 weeks gestation  who presents to labor and delivery after precipitous labor, delivered in car outside of emergency dept.    States has hx precipitous L&D at 32 weeks with last pregnancy.  This pregnancy had pain x 1 hour, water broke on way to the car,  states fetal head was coming out so he drove as fast as possible here.  He feels time of delivery was 1155.    Pregnancy complicated by hx precipitous L&D, she states otherwise healthy.  Patient of CNM team at [facility]. Chart reviewed in [facility].  GCT was normal, GBBS negative rubella immune RPR neg, hep B s AG neg.    Patient Active Problem List   Diagnosis    Indication for care in labor or delivery    Labor, precipitous, delivered    H/O precipitous labor and deliveries, antepartum, third trimester       Meds:  PNV    Allergies:  chloroquine      OB History    Para Term  AB Living   2 0 0 0 0 0   SAB IAB Ectopic Multiple Live Births   0 0 0 0 0      # Outcome Date GA Lbr Syed/2nd Weight Sex Type Anes PTL Lv   2 Current            1                 No past medical history on file.    No past surgical history on file.      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   Norm works in finance. They have a 5 yo son at home    ROS: Non-contributory.  Cramping as placenta delivering.    OBJECTIVE:  Blood pressure 109/71, temperature 98.1  F (36.7  C), temperature source Oral, resp. rate 16, last menstrual period 10/07/2023.    WDWN gravid woman in NAD  Lungs clear  CV RRR  Abd with fundus firm at umbilicus.    Pt transferred to delivery bed.  Umbilical cord clamped and cut to free  male, 7# 4 oz, apgars  unassigned as baby >5 minutes old, but appears well, vigorous.    Intact normal placenta delivered spontaneously.  Long cord.    Second degree laceration repaired after local infiltration, with 3-0 vicryl.  Very small right periurethral  laceration not repaired.   ml.  Given IM pitocin.  Counts correct.    ASSESSMENT:  33 year old yo G2 now p1102 at 40 3/7 weeks, s/p precipitous labor and delivery en route to emergency department.    S/p deliver of placenta, repair 2nd degree laceration.    PLAN:  Admit to labor and delivery.  Routine post partum cares.    Nae Ann MD  July 14, 2024

## 2024-07-14 NOTE — PLAN OF CARE
Data: Haley Jacob transferred to room 432 via wheelchair at 1520. Baby transferred via parent's arms.  Action: Receiving unit notified of transfer: Yes. Patient and family notified of room change. Report given to VINCE Ross at 1530. Belongings sent to receiving unit. Accompanied by Registered Nurse. Oriented patient to surroundings. Call light within reach. ID bands double-checked with receiving RN.  Response: Patient tolerated transfer and is stable.    Patients mobililty level scored using the bedside mobility assistance tool (BMAT). Patient is at a mobility level test number: 4. Mobility equipment used: none required. Required assist of 0 staff members. Further use of BMAT scoring not required.      Goal Outcome Evaluation:      Plan of Care Reviewed With: patient, spouse    Overall Patient Progress: improvingOverall Patient Progress: improving    Outcome Evaluation: Stable vaginal delivery.      Problem: Adult Inpatient Plan of Care  Goal: Plan of Care Review  Outcome: Progressing  Flowsheets (Taken 7/14/2024 1530)  Outcome Evaluation: Stable vaginal delivery.  Plan of Care Reviewed With:   patient   spouse  Overall Patient Progress: improving  Goal: Patient-Specific Goal (Individualized)  Outcome: Progressing  Goal: Absence of Hospital-Acquired Illness or Injury  Outcome: Progressing  Goal: Optimal Comfort and Wellbeing  Outcome: Progressing  Intervention: Monitor Pain and Promote Comfort  Recent Flowsheet Documentation  Taken 7/14/2024 1430 by Lorri Montgomery RN  Pain Management Interventions:   heat applied   medication offered but refused  Taken 7/14/2024 1415 by Lorri Montgomery RN  Pain Management Interventions:   heat applied   medication offered but refused  Taken 7/14/2024 1353 by Lorri Montgomery RN  Pain Management Interventions: medication (see MAR)  Goal: Readiness for Transition of Care  Outcome: Progressing  Intervention: Mutually Develop Transition Plan  Recent Flowsheet  Documentation  Taken 7/14/2024 1336 by Lorri Montgomery, RN  Equipment Currently Used at Home: none     Problem: Postpartum (Vaginal Delivery)  Goal: Successful Parent Role Transition  Outcome: Progressing  Goal: Hemostasis  Outcome: Progressing  Goal: Absence of Infection Signs and Symptoms  Outcome: Progressing  Goal: Anesthesia/Sedation Recovery  Outcome: Progressing  Goal: Optimal Pain Control and Function  Outcome: Progressing  Intervention: Prevent or Manage Pain  Recent Flowsheet Documentation  Taken 7/14/2024 1430 by Lorri Montgomery, RN  Pain Management Interventions:   heat applied   medication offered but refused  Taken 7/14/2024 1415 by Lorri Montgomery, RN  Pain Management Interventions:   heat applied   medication offered but refused  Taken 7/14/2024 1353 by Lorri Montgomery, RN  Pain Management Interventions: medication (see MAR)  Goal: Effective Urinary Elimination  Outcome: Progressing

## 2024-07-14 NOTE — DISCHARGE SUMMARY
[facility] Discharge Summary    Haley Jacob MRN# [id number]   Age: 33 year old YOB: 1990     Date of Admission:  2024  Date of Discharge::  24   Admitting Physician:  Meghana Lowe MD  Discharge Physician:  Venessa Lopez MD           Admission Diagnoses:   IUP at 40w3d  Out of hospital delivery of    GBS negative  H/o  delivery at 32 weeks          Discharge Diagnosis:     IUP at 40w3d, now delivered  In facility delivery of placenta and perineal repair           Procedures:     Procedure(s): In facility delivery of placenta and perineal repair           Medications Prior to Admission:     Medications Prior to Admission   Medication Sig Dispense Refill Last Dose    prenatal vitamin iron-folic acid 27mg-0.8mg (PRENATAL S) 27 mg iron- 800 mcg Tab tablet [PRENATAL VITAMIN IRON-FOLIC ACID 27MG-0.8MG (PRENATAL S) 27 MG IRON- 800 MCG TAB TABLET] Take 1 tablet by mouth daily.   Past Week          Discharge Medications:        Review of your medicines        START taking        Dose / Directions   acetaminophen 325 MG tablet  Commonly known as: TYLENOL      Dose: 650 mg  Take 2 tablets (650 mg) by mouth every 4 hours as needed for mild pain or fever (greater than or equal to 38 C /100.4 F (oral) or 38.5 C/ 101.4 F (core).)  Refills: 0     docusate sodium 100 MG capsule  Commonly known as: COLACE      Dose: 100 mg  Take 1 capsule (100 mg) by mouth daily  Quantity: 30 capsule  Refills: 0     ibuprofen 800 MG tablet  Commonly known as: ADVIL/MOTRIN      Dose: 800 mg  Take 1 tablet (800 mg) by mouth every 6 hours as needed for other (cramping)  Quantity: 60 tablet  Refills: 0            CONTINUE these medicines which have NOT CHANGED        Dose / Directions   prenatal multivitamin  with iron 28-0.8 MG Tabs      Dose: 1 tablet  [PRENATAL VITAMIN IRON-FOLIC ACID 27MG-0.8MG (PRENATAL S) 27 MG IRON- 800 MCG TAB TABLET] Take 1 tablet by mouth daily.  Refills: 0                Where to get your medicines        These medications were sent to [place] - [address] [facility]  [address] [facility] [address]      Phone: [phone]   docusate sodium 100 MG capsule  ibuprofen 800 MG tablet       Some of these will need a paper prescription and others can be bought over the counter. Ask your nurse if you have questions.    You don't need a prescription for these medications  acetaminophen 325 MG tablet             Consultations:   Lactation          Brief Admission History and Intrapartum Course:          Haley Jacob is a 33 year old now p 102 who at 40 3/7 weeks experienced precipitous labor and delivery, delivered outside of Emergency department.  I was called as emergency in-house obstetrician.     Pt transferred to delivery bed.  Umbilical cord clamped and cut to free  male, 7# 4 oz, apgars  unassigned as baby >5 minutes old, but appears well, vigorous.     Intact normal placenta delivered spontaneously.  Long cord.     Second degree laceration repaired after local infiltration, with 3-0 vicryl.  Very small right periurethral laceration not repaired.   ml.  Given IM pitocin.        Delivery Summary    Haley Jacob MRN# [id number]   Age: 33 year old YOB: 1990          Vernon Jacob-Haley [[id number]]      Labor Event Times      Latent labor onset date/time: 2024 1100    Start pushing date/time: 2024 1155          Labor Length      3rd Stage (hrs): 0 (min): 30          Labor Events     labor?: No  Labor Type: Spontaneous     Antibiotics received during labor?: No       Rupture date/time: 24 1150   Rupture type: Spontaneous Rupture of Membranes  Fluid color: Clear  Fluid odor: Normal       Delivery/Placenta Date and Time      Delivery Date: 24 Delivery Time: 11:55 AM   Placenta Date/Time: 2024 12:25 PM  Oxytocin given at the time of delivery: after delivery of  "placenta  Delivering clinician: Nae Ann MD   Other personnel present at delivery:  Provider Role   Lorri Smith, RN Registered Nurse   Celia Lau RN Registered Nurse             Vaginal Counts       Initial count performed by 2 team members:  Two Team Members   Dr. Marissa Smith RN         Needles Suture Needles Sponges (RETIRED) Instruments   Initial counts 2  5    Added to count  1     Relief counts       Final counts 2 1 5            Placed during labor Accounted for at the end of labor   FSE No NA   IUPC No NA   Cervidil No NA                  Final count performed by 2 team members:  Two Team Members   Dr. Marissa Smith RN      Final count correct?: Yes  Post-Birth Team Debrief: Complete       Apgars    Living status: Living   1 Minute 5 Minute 10 Minute 15 Minute 20 Minute   Skin color:     1    Heart rate:     2    Reflex irritability:     2    Muscle tone:     2    Respiratory effort:     2    Total:     9    Apgars assigned by: ERNESTO SMITH RN       Cord      Vessels: 3 Vessels    Cord Complications: None               Cord Blood Disposition: Unable to collect    Gases Sent?: Unable to collect    Delayed cord clamping?: Yes    Cord Clamping Delay (seconds): >120 seconds    Stem cell collection?: No            Resuscitation    Methods: None       [place] Measurements      Weight: 7 lb 4.1 oz Length: 1' 9\"     Head circumference: 35.6 cm           Skin to Skin and Feeding Plan      Skin to skin initiation date/time: 1841    Skin to skin with: Mother  Skin to skin end date/time:            Labor Events and Shoulder Dystocia    Fetal Tracing Comments: no monitoring, delivered outside ED  Shoulder dystocia present?: Neg       Delivery (Maternal) (Provider to Complete) ([id number])    Episiotomy: None  Perineal lacerations: 2nd Repaired?: Yes     Periurethral laceration: right Repaired?: No   Repair suture: 3-0 Vicryl  Genital tract inspection " done: Pos       Blood Loss  Mother: Haley Jacob #[id number]     Start of Mother's Information      Delivery Blood Loss  07/13/24 2355 - 07/15/24 1155      Delivery QBL (mL) Hospital Encounter 250 mL    Total  250 mL               End of Mother's Information  Mother: Haley Jacob #[id number]                Delivery - Provider to Complete ([id number])    Delivering clinician: Nae Ann MD  Delivery Type (Choose the 1 that will go to the Birth History): Vaginal, Spontaneous                         Other personnel:  Provider Role   Lorri Montgomery, RN Registered Nurse   Celia Lau RN Registered Nurse                    Placenta    Date/Time: 7/14/2024 12:25 PM  Removal: Spontaneous  Disposition: Patient possesion             Anesthesia    Method: None                                  Post-partum Course:   The patient's hospital course was unremarkable.  On discharge, her pain was well controlled. Vaginal bleeding is similar to peak menstrual flow.  Voiding without difficulty.  Ambulating well and tolerating a normal diet.  No fever.  Breastfeeding well.  Infant is stable.  She was discharged on post-partum day #2.          Discharge Instructions and Follow-Up:     Discharge diet: Regular   Discharge activity: Pelvic rest for 6 weeks including no sexual intercourse, tampons, or douching.    Discharge follow-up: Follow up with your primary OB for a routine postpartum visit in 6 weeks           Discharge Disposition:     Discharged to home      Lauren MacNeill, MD [facility]  [phone]  07/16/2024 12:24 PM

## 2024-07-15 LAB — T PALLIDUM AB SER QL: NONREACTIVE

## 2024-07-15 PROCEDURE — 250N000013 HC RX MED GY IP 250 OP 250 PS 637: Performed by: OBSTETRICS & GYNECOLOGY

## 2024-07-15 PROCEDURE — 999N000080 HC STATISTIC IP LACTATION SERVICES 16-30 MIN

## 2024-07-15 PROCEDURE — 120N000001 HC R&B MED SURG/OB

## 2024-07-15 RX ORDER — DOCUSATE SODIUM 100 MG/1
100 CAPSULE, LIQUID FILLED ORAL DAILY
Qty: 30 CAPSULE | Refills: 0 | Status: SHIPPED | OUTPATIENT
Start: 2024-07-16

## 2024-07-15 RX ORDER — IBUPROFEN 800 MG/1
800 TABLET, FILM COATED ORAL EVERY 6 HOURS PRN
Qty: 60 TABLET | Refills: 0 | Status: SHIPPED | OUTPATIENT
Start: 2024-07-15

## 2024-07-15 RX ORDER — ACETAMINOPHEN 325 MG/1
650 TABLET ORAL EVERY 4 HOURS PRN
COMMUNITY
Start: 2024-07-15

## 2024-07-15 RX ADMIN — ACETAMINOPHEN 650 MG: 325 TABLET, FILM COATED ORAL at 12:29

## 2024-07-15 RX ADMIN — ACETAMINOPHEN 650 MG: 325 TABLET, FILM COATED ORAL at 22:50

## 2024-07-15 RX ADMIN — DOCUSATE SODIUM 100 MG: 100 CAPSULE, LIQUID FILLED ORAL at 08:51

## 2024-07-15 RX ADMIN — ACETAMINOPHEN 650 MG: 325 TABLET, FILM COATED ORAL at 05:39

## 2024-07-15 ASSESSMENT — ACTIVITIES OF DAILY LIVING (ADL)
ADLS_ACUITY_SCORE: 20

## 2024-07-15 NOTE — PLAN OF CARE
"VSS, pt breast feeding, talked about 24 hr expectations, seen by lactation RN this shift, pain controlled with tylenol,  aware of ibuprofen availability and other non-pharmacological interventions; good bonding with baby observed, spouse is in room and working on the birth certificate.  Problem: Adult Inpatient Plan of Care  Goal: Plan of Care Review  Description: The Plan of Care Review/Shift note should be completed every shift.  The Outcome Evaluation is a brief statement about your assessment that the patient is improving, declining, or no change.  This information will be displayed automatically on your shift  note.  Outcome: Progressing  Flowsheets (Taken 7/15/2024 1317)  Plan of Care Reviewed With:   patient   family  Goal: Patient-Specific Goal (Individualized)  Description: You can add care plan individualizations to a care plan. Examples of Individualization might be:  \"Parent requests to be called daily at 9am for status\", \"I have a hard time hearing out of my right ear\", or \"Do not touch me to wake me up as it startles  me\".  Outcome: Progressing  Goal: Absence of Hospital-Acquired Illness or Injury  Outcome: Progressing  Intervention: Prevent Skin Injury  Recent Flowsheet Documentation  Taken 7/15/2024 0854 by Scarlett Álvarez RN  Body Position: position changed independently  Goal: Optimal Comfort and Wellbeing  Outcome: Progressing  Intervention: Monitor Pain and Promote Comfort  Recent Flowsheet Documentation  Taken 7/15/2024 1314 by Scarlett Álvarez RN  Pain Management Interventions: declines  Taken 7/15/2024 1229 by Scarlett Álvarez RN  Pain Management Interventions: medication (see MAR)  Taken 7/15/2024 0854 by Scarlett Álvarez RN  Pain Management Interventions: declines  Intervention: Provide Person-Centered Care  Recent Flowsheet Documentation  Taken 7/15/2024 0854 by Scarlett Álvarez RN  Trust Relationship/Rapport:   care explained   choices provided   emotional " support provided   empathic listening provided   questions answered   questions encouraged   reassurance provided   thoughts/feelings acknowledged  Goal: Readiness for Transition of Care  Outcome: Progressing     Problem: Postpartum (Vaginal Delivery)  Goal: Successful Parent Role Transition  Outcome: Progressing  Intervention: Support Parent Role Transition  Recent Flowsheet Documentation  Taken 7/15/2024 0854 by Scarlett Álvarez RN  Supportive Measures: decision-making supported  Parent-Child Attachment Promotion:   caring behavior modeled   cue recognition promoted   rooming-in promoted  Goal: Hemostasis  Outcome: Progressing  Goal: Absence of Infection Signs and Symptoms  Outcome: Progressing  Intervention: Prevent or Manage Infection  Recent Flowsheet Documentation  Taken 7/15/2024 0854 by Scarlett Álvarez RN  Infection Management: aseptic technique maintained  Goal: Anesthesia/Sedation Recovery  Outcome: Progressing  Goal: Optimal Pain Control and Function  Outcome: Progressing  Intervention: Prevent or Manage Pain  Recent Flowsheet Documentation  Taken 7/15/2024 1314 by Scarlett Álvarez RN  Pain Management Interventions: declines  Taken 7/15/2024 1229 by Scarlett Álvarez RN  Pain Management Interventions: medication (see MAR)  Taken 7/15/2024 0854 by Scarlett Álvarez RN  Pain Management Interventions: declines  Goal: Effective Urinary Elimination  Outcome: Progressing     Problem: Skin Injury Risk Increased  Goal: Skin Health and Integrity  Outcome: Progressing  Intervention: Optimize Skin Protection  Recent Flowsheet Documentation  Taken 7/15/2024 0854 by Scarlett Álvarez RN  Activity Management: up ad soni  Head of Bed (HOB) Positioning: HOB at 60-90 degrees   Goal Outcome Evaluation:      Plan of Care Reviewed With: patient, family

## 2024-07-15 NOTE — LACTATION NOTE
Lactation in to visit with patient. First baby was in NICU and she mainly pumped and bottle fed. This baby latches well, is somewhat sleepy. Baby nursing STS, needing tactile stimulation and breast compressions to stay active. Few swallows heard. Knows to watch for cues and feed even sooner than 2-3 hours if baby cueing.    Baby in to follow up. Baby latched more active at breast. Breast pump information given with prescription for pump other than what we have.

## 2024-07-15 NOTE — PLAN OF CARE
Vital signs stable. Fundus is firm and midline, scant lochia. Voiding spontaneously. Ambulating independently, denies dizziness. Reports pain is manageable with tylenol and ibuprofen. Breastfeeding every 2-3 hours, latch observed. Attentive to  cues.     Problem: Adult Inpatient Plan of Care  Goal: Plan of Care Review  Outcome: Progressing  Flowsheets (Taken 7/15/2024 0631)  Plan of Care Reviewed With: patient  Overall Patient Progress: improving  Goal: Patient-Specific Goal (Individualized)  Outcome: Progressing  Goal: Absence of Hospital-Acquired Illness or Injury  Outcome: Progressing  Intervention: Prevent Skin Injury  Recent Flowsheet Documentation  Taken 7/15/2024 014 by Geri Ortega RN  Body Position: position changed independently  Intervention: Prevent Infection  Recent Flowsheet Documentation  Taken 7/15/2024 0140 by Geri Ortega RN  Infection Prevention:   rest/sleep promoted   hand hygiene promoted  Goal: Optimal Comfort and Wellbeing  Outcome: Progressing  Intervention: Monitor Pain and Promote Comfort  Recent Flowsheet Documentation  Taken 2024 by Geri Ortega RN  Pain Management Interventions: medication (see MAR)  Intervention: Provide Person-Centered Care  Recent Flowsheet Documentation  Taken 7/15/2024 014 by Geri Ortega RN  Trust Relationship/Rapport:   care explained   choices provided   questions answered   thoughts/feelings acknowledged  Goal: Readiness for Transition of Care  Outcome: Progressing  Problem: Postpartum (Vaginal Delivery)  Goal: Successful Parent Role Transition  Outcome: Progressing  Goal: Hemostasis  Outcome: Progressing  Goal: Absence of Infection Signs and Symptoms  Outcome: Progressing  Goal: Anesthesia/Sedation Recovery  Outcome: Progressing  Goal: Optimal Pain Control and Function  Outcome: Progressing  Intervention: Prevent or Manage Pain  Recent Flowsheet Documentation  Taken 2024 by Geri Ortega RN  Pain Management  Interventions: medication (see MAR)  Goal: Effective Urinary Elimination  Outcome: Progressing  Problem: Skin Injury Risk Increased  Goal: Skin Health and Integrity  Outcome: Progressing  Intervention: Optimize Skin Protection  Recent Flowsheet Documentation  Taken 7/15/2024 0140 by Geri Ortega, RN  Activity Management:   activity adjusted per tolerance   up ad soni   Goal Outcome Evaluation:      Plan of Care Reviewed With: patient    Overall Patient Progress: improvingOverall Patient Progress: improving

## 2024-07-15 NOTE — PROGRESS NOTES
"Patient Name:  Haley Jacob   MRN:  [id number]  Age:  33 year old    YOB: 1990      POSTPARTUM PROGRESS NOTE    Pt is PPD#1 s/p vaginal delivery.  She is doing well without complaints.  Pt is ambulating, voiding, tolerating a regular diet.  Pain is well controlled and lochia is within normal limits.  She is breastfeeding.  Baby is doing well.    Objective:    Temp:  [98  F (36.7  C)-98.2  F (36.8  C)] 98  F (36.7  C)  Pulse:  [81-99] 82  Resp:  [16-18] 17  BP: ()/(44-71) 102/54  185 lbs 0 oz    General Appearance:  NAD  Lungs:  unlabored  Cardiovascular:  RRR  Abdomen:  nontender, nondistended  Fundus:  firm, below the umbilicus      Lower extremities:  trace symmetric edema    Lab Review:    ABO/RH(D)   Date Value Ref Range Status   2024 A POS  Final     Hemoglobin   Date Value Ref Range Status   2024 12.7 11.7 - 15.7 g/dL Final   2024 12.0 11.7 - 15.7 g/dL Final     Hematocrit   Date Value Ref Range Status   2024 39.8 35.0 - 47.0 % Final   2024 36.6 35.0 - 47.0 % Final       Lab Results   Component Value Date    WBC 8.9 2024     Lab Results   Component Value Date    RBC 5.05 2024     Lab Results   Component Value Date    HGB 12.7 2024     Lab Results   Component Value Date    HCT 39.8 2024     No components found for: \"MCT\"  Lab Results   Component Value Date    MCV 79 2024     Lab Results   Component Value Date    MCH 25.1 2024     Lab Results   Component Value Date    MCHC 31.9 2024     Lab Results   Component Value Date    RDW 15.3 2024     Lab Results   Component Value Date     2024       Assessment: 32yo  PPD#1 s/p precipitous vaginal delivery, doing well.    Plan:   - Postpartum: recovering well. Pain well controlled. Cont PO pain meds and regular diet. Encourage ambulation.  - Contraception: undecided  - Dispo: anticipate DC today or tomorrow      Meredith Chan, MD [facility]  July 15, 2024

## 2024-07-16 VITALS
HEART RATE: 81 BPM | HEIGHT: 63 IN | BODY MASS INDEX: 32.78 KG/M2 | RESPIRATION RATE: 18 BRPM | TEMPERATURE: 98.1 F | WEIGHT: 185 LBS | SYSTOLIC BLOOD PRESSURE: 116 MMHG | DIASTOLIC BLOOD PRESSURE: 60 MMHG

## 2024-07-16 ASSESSMENT — ACTIVITIES OF DAILY LIVING (ADL)
ADLS_ACUITY_SCORE: 20
DEPENDENT_IADLS:: INDEPENDENT
ADLS_ACUITY_SCORE: 20

## 2024-07-16 NOTE — PLAN OF CARE
"Pt able to get some rest between cares during the night.  States ordered pain medications keeping her comfortable.  Also using ice and tucks for hemorrhoid and rebecca lac w/ relief.  Voiding sufficient amts w/o difficulty.  Spouse present at beginning of shift, but left w/ other child for home.  Another support person helpful w/ mom/baby cares.  Anticipate discharge to home later today.  Problem: Adult Inpatient Plan of Care  Goal: Plan of Care Review  Description: The Plan of Care Review/Shift note should be completed every shift.  The Outcome Evaluation is a brief statement about your assessment that the patient is improving, declining, or no change.  This information will be displayed automatically on your shift  note.  Outcome: Progressing  Flowsheets (Taken 7/16/2024 9908)  Plan of Care Reviewed With: patient  Overall Patient Progress: improving  Goal: Patient-Specific Goal (Individualized)  Description: You can add care plan individualizations to a care plan. Examples of Individualization might be:  \"Parent requests to be called daily at 9am for status\", \"I have a hard time hearing out of my right ear\", or \"Do not touch me to wake me up as it startles  me\".  Outcome: Progressing  Goal: Absence of Hospital-Acquired Illness or Injury  Outcome: Progressing  Intervention: Prevent Skin Injury  Recent Flowsheet Documentation  Taken 7/16/2024 0153 by Francisca Johnson RN  Body Position: position changed independently  Goal: Optimal Comfort and Wellbeing  Outcome: Progressing  Intervention: Provide Person-Centered Care  Recent Flowsheet Documentation  Taken 7/16/2024 0153 by Francisca Johnson RN  Trust Relationship/Rapport:   care explained   choices provided   questions answered   questions encouraged  Goal: Readiness for Transition of Care  Outcome: Progressing     Problem: Postpartum (Vaginal Delivery)  Goal: Successful Parent Role Transition  Outcome: Progressing  Intervention: Support Parent Role Transition  Recent " Flowsheet Documentation  Taken 7/16/2024 0153 by Francisca Johnson RN  Supportive Measures: self-care encouraged  Parent-Child Attachment Promotion:   skin-to-skin contact encouraged   positive reinforcement provided  Goal: Hemostasis  Outcome: Progressing  Goal: Absence of Infection Signs and Symptoms  Outcome: Progressing  Goal: Anesthesia/Sedation Recovery  Outcome: Progressing  Goal: Optimal Pain Control and Function  Outcome: Progressing  Goal: Effective Urinary Elimination  Outcome: Progressing  Intervention: Monitor and Manage Urinary Retention  Recent Flowsheet Documentation  Taken 7/16/2024 0153 by Francisca Johnson, RN  Urinary Elimination Promotion: frequent voiding encouraged   Goal Outcome Evaluation:      Plan of Care Reviewed With: patient    Overall Patient Progress: improvingOverall Patient Progress: improving

## 2024-07-16 NOTE — DISCHARGE INSTRUCTIONS
Warning Signs after Having a Baby    Keep this paper on your fridge or somewhere else where you can see it.    Call your provider if you have any of these symptoms up to 12 weeks after having your baby.    Thoughts of hurting yourself or your baby  Pain in your chest or trouble breathing  Severe headache not helped by pain medicine  Eyesight concerns (blurry vision, seeing spots or flashes of light, other changes to eyesight)  Fainting, shaking or other signs of a seizure    Call 9-1-1 if you feel that it is an emergency.     The symptoms below can happen to anyone after giving birth. They can be very serious. Call your provider if you have any of these warning signs.    My provider s phone number: _______________________    Losing too much blood (hemorrhage)    Call your provider if you soak through a pad in less than an hour or pass blood clots bigger than a golf ball. These may be signs that you are bleeding too much.    Blood clots in the legs or lungs    After you give birth, your body naturally clots its blood to help prevent blood loss. Sometimes this increased clotting can happen in other areas of the body, like the legs or lungs. This can block your blood flow and be very dangerous.     Call your provider if you:  Have a red, swollen spot on the back of your leg that is warm or painful when you touch it.   Are coughing up blood.     Infection    Call your provider if you have any of these symptoms:  Fever of 100.4 F (38 C) or higher.  Pain or redness around your stitches if you had an incision.   Any yellow, white, or green fluid coming from places where you had stitches or surgery.    Mood Problems (postpartum depression)    Many people feel sad or have mood changes after having a baby. But for some people, these mood swings are worse.     Call your provider right away if you feel so anxious or nervous that you can't care for yourself or your baby.    Preeclampsia (high blood pressure)    Even if you  didn't have high blood pressure when you were pregnant, you are at risk for the high blood pressure disease called preeclampsia. This risk can last up to 12 weeks after giving birth.     Call your provider if you have:   Pain on your right side under your rib cage  Sudden swelling in the hands and face    Remember: You know your body. If something doesn't feel right, get medical help.     For informational purposes only. Not to replace the advice of your health care provider. Copyright 2020 [facility]. All rights reserved. Clinically reviewed by Tania Ramirez, RNC-OB, MSN. [employer] [id number] - Rev .     Section: What to Expect at Home  Your Recovery     A  section, or , is surgery to deliver your baby through a cut that the doctor makes in your lower belly and uterus. The cut is called an incision.  You may have some pain in your lower belly and need pain medicine for 1 to 2 weeks. You can expect some vaginal bleeding for several weeks. You will probably need about 6 weeks to fully recover.  It's important to take it easy while the incision heals. Avoid heavy lifting, strenuous activities, and exercises that strain the belly muscles while you recover. Ask a family member or friend for help with housework, cooking, and shopping.  This care sheet gives you a general idea about how long it will take for you to recover. But each person recovers at a different pace. Follow the steps below to get better as quickly as possible.  How can you care for yourself at home?  Activity    Rest when you feel tired. Getting enough sleep will help you recover.     Try to walk each day. Start by walking a little more than you did the day before. Bit by bit, increase the amount you walk. Walking boosts blood flow and helps prevent pneumonia, constipation, and blood clots.     Avoid strenuous activities, such as bicycle riding, jogging, weightlifting, and aerobic exercise, for 6 weeks or until  your doctor says it is okay.     Until your doctor says it is okay, do not lift anything heavier than your baby.     Do not do sit-ups or other exercises that strain the belly muscles for 6 weeks or until your doctor says it is okay.     Hold a pillow over your incision when you cough or take deep breaths. This will support your belly and decrease your pain.     You may shower as usual. Pat the incision dry when you are done.     You will have some vaginal bleeding. Wear sanitary pads. Do not douche or use tampons until your doctor says it is okay.     Ask your doctor when you can drive again.     You will probably need to take at least 6 weeks off work. It depends on the type of work you do and how you feel.     Ask your doctor when it is okay for you to have sex.   Diet    You can eat your normal diet. If your stomach is upset, try bland, low-fat foods like plain rice, broiled chicken, toast, and yogurt.     Drink plenty of fluids (unless your doctor tells you not to).     You may notice that your bowel movements are not regular right after your surgery. This is common. Try to avoid constipation and straining with bowel movements. You may want to take a fiber supplement every day. If you have not had a bowel movement after a couple of days, ask your doctor about taking a mild laxative.     If you are breastfeeding, limit alcohol. Alcohol can cause a lack of energy and other health problems for the baby when a breastfeeding woman drinks heavily. It can also get in the way of a mom's ability to feed her baby or to care for the child in other ways. There isn't a lot of research about exactly how much alcohol can harm a baby. Having no alcohol is the safest choice for your baby. If you choose to have a drink now and then, have only one drink, and limit the number of occasions that you have a drink. Wait to breastfeed at least 2 hours after you have a drink to reduce the amount of alcohol the baby may get in the milk.    Medicines    Your doctor will tell you if and when you can restart your medicines. You will also get instructions about taking any new medicines.     If you stopped taking aspirin or some other blood thinner, your doctor will tell you when to start taking it again.     Take pain medicines exactly as directed.  If the doctor gave you a prescription medicine for pain, take it as prescribed.  If you are not taking a prescription pain medicine, ask your doctor if you can take an over-the-counter medicine.     If you think your pain medicine is making you sick to your stomach:  Take your medicine after meals (unless your doctor has told you not to).  Ask your doctor for a different pain medicine.     If your doctor prescribed antibiotics, take them as directed. Do not stop taking them just because you feel better. You need to take the full course of antibiotics.   Incision care    If you have strips of tape on the incision, leave the tape on for a week or until it falls off.     Wash the area daily with warm, soapy water, and pat it dry. Don't use hydrogen peroxide or alcohol, which can slow healing. You may cover the area with a gauze bandage if it weeps or rubs against clothing. Change the bandage every day.     Keep the area clean and dry.   Other instructions    If you breastfeed your baby, you may be more comfortable while you are healing if you don't rest your baby on your belly. Try tucking your baby under your arm, with your baby's body along the side you will be feeding on. Support your baby's upper body with your arm. With that hand you can control your baby's head to bring your baby's mouth to your breast. This is sometimes called the football hold.   Follow-up care is a key part of your treatment and safety. Be sure to make and go to all appointments, and call your doctor if you are having problems. It's also a good idea to know your test results and keep a list of the medicines you take.  When should you  call for help?  Share this information with your partner, family, or a friend. They can help you watch for warning signs.  Call 911  anytime you think you may need emergency care. For example, call if:    You feel you cannot stop from hurting yourself, your baby, or someone else.     You passed out (lost consciousness).     You have chest pain, are short of breath, or cough up blood.     You have a seizure.   Where to get help 24 hours a day, 7 days a week   If you or someone you know talks about suicide, self-harm, a mental health crisis, a substance use crisis, or any other kind of emotional distress, get help right away. You can:    Call the Suicide and Crisis Lifeline at 988.     Call 9-878-505-TALK (1-933.579.8011).     Text HOME to 148169 to access the Crisis Text Line.   Consider saving these numbers in your phone.  Go to [employer] for more information or to chat online.  Call your doctor or midwife now or seek immediate medical care if:    You have loose stitches, or your incision comes open.     You have signs of hemorrhage (too much bleeding), such as:  Heavy vaginal bleeding. This means that you are soaking through one or more pads in an hour. Or you pass blood clots bigger than an egg.  Feeling dizzy or lightheaded, or you feel like you may faint.  Feeling so tired or weak that you cannot do your usual activities.  A fast or irregular heartbeat.  New or worse belly pain.     You have symptoms of infection, such as:  Increased pain, swelling, warmth, or redness.  Red streaks leading from the incision.  Pus draining from the incision.  A fever.  Frequent or painful urination or blood in your urine.  Vaginal discharge that smells bad.  New or worse belly pain.     You have symptoms of a blood clot in your leg (called a deep vein thrombosis), such as:  Pain in the calf, back of the knee, thigh, or groin.  Swelling in the leg or groin.  A color change on the leg or groin. The skin may be reddish or  "purplish, depending on your usual skin color.     You have signs of preeclampsia, such as:  Sudden swelling of your face, hands, or feet.  New vision problems (such as dimness, blurring, or seeing spots).  A severe headache.     You have signs of heart failure, such as:  New or increased shortness of breath.  New or worse swelling in your legs, ankles, or feet.  Sudden weight gain, such as more than 2 to 3 pounds in a day or 5 pounds in a week.  Feeling so tired or weak that you cannot do your usual activities.     You had spinal or epidural pain relief and have:  New or worse back pain.  Increased pain, swelling, warmth, or redness at the injection site.  Tingling, weakness, or numbness in your legs or groin.   Watch closely for changes in your health, and be sure to contact your doctor or midwife if:    Your vaginal bleeding isn't decreasing.     You feel sad, anxious, or hopeless for more than a few days.     You are having problems with your breasts or breastfeeding.   Where can you learn more?  Go to https://www.[employer].net/patiented  Enter M806 in the search box to learn more about \" Section: What to Expect at Home.\"  Current as of: July 10, 2023               Content Version: 14.0    8428-1850 [employer].   Care instructions adapted under license by your healthcare professional. If you have questions about a medical condition or this instruction, always ask your healthcare professional. [employer] disclaims any warranty or liability for your use of this information.       "

## 2024-07-16 NOTE — PLAN OF CARE
"Goal Outcome Evaluation:      Plan of Care Reviewed With: patient    Overall Patient Progress: improving    Outcome Evaluation: Pt progressing on all care plan goals.    Data: Vital signs within normal limits. Postpartum checks within normal limits - see flow record. Patient eating and drinking normally. Patient able to empty bladder independently and is up ambulating. Patient performing self cares, is able to care for infant and is breastfeeding every 2-3 hours. Using ice packs and tucks pads for discomfort.   Action: Adequate pain control noted by patient, pt medicated with tylenol this shift, see MAR. Patient education done, see flow record.  Response: Positive attachment behaviors observed with infant. Patient's significant other, son, and mother present this shift.   Plan: Continue current plan of care.  Anticipate discharge on 7/16. Will continue to monitor and treat.         Problem: Adult Inpatient Plan of Care  Goal: Plan of Care Review  Description: The Plan of Care Review/Shift note should be completed every shift.  The Outcome Evaluation is a brief statement about your assessment that the patient is improving, declining, or no change.  This information will be displayed automatically on your shift  note.  Outcome: Progressing  Flowsheets (Taken 7/15/2024 2124)  Outcome Evaluation: Pt progressing on all care plan goals.  Plan of Care Reviewed With: patient  Overall Patient Progress: improving  Goal: Patient-Specific Goal (Individualized)  Description: You can add care plan individualizations to a care plan. Examples of Individualization might be:  \"Parent requests to be called daily at 9am for status\", \"I have a hard time hearing out of my right ear\", or \"Do not touch me to wake me up as it startles  me\".  Outcome: Progressing  Goal: Absence of Hospital-Acquired Illness or Injury  Outcome: Progressing  Intervention: Prevent Infection  Recent Flowsheet Documentation  Taken 7/15/2024 1632 by Desiree Harris " EVGENY  Infection Prevention:   hand hygiene promoted   rest/sleep promoted  Goal: Optimal Comfort and Wellbeing  Outcome: Progressing  Intervention: Monitor Pain and Promote Comfort  Recent Flowsheet Documentation  Taken 7/15/2024 1632 by Desiree Harris  Pain Management Interventions: declines  Intervention: Provide Person-Centered Care  Recent Flowsheet Documentation  Taken 7/15/2024 1632 by Desiree Harris  Trust Relationship/Rapport:   care explained   choices provided   emotional support provided   empathic listening provided   questions answered   questions encouraged   thoughts/feelings acknowledged   reassurance provided  Goal: Readiness for Transition of Care  Outcome: Progressing     Problem: Postpartum (Vaginal Delivery)  Goal: Successful Parent Role Transition  Outcome: Progressing  Goal: Hemostasis  Outcome: Progressing  Goal: Absence of Infection Signs and Symptoms  Outcome: Progressing  Goal: Anesthesia/Sedation Recovery  Outcome: Progressing  Goal: Optimal Pain Control and Function  Outcome: Progressing  Intervention: Prevent or Manage Pain  Recent Flowsheet Documentation  Taken 7/15/2024 1632 by Desiree Harris  Pain Management Interventions: declines  Goal: Effective Urinary Elimination  Outcome: Progressing     Problem: Skin Injury Risk Increased  Goal: Skin Health and Integrity  Outcome: Progressing  Intervention: Optimize Skin Protection  Recent Flowsheet Documentation  Taken 7/15/2024 1632 by Desiree Harris  Activity Management:   up ad soni   activity adjusted per tolerance

## 2024-07-16 NOTE — PLAN OF CARE
"Data: Vital signs within normal limits. Postpartum checks within normal limits - see flow record. Patient eating and drinking normally. Patient able to empty bladder independently and is up ambulating. No apparent signs of infection. Incision healing well. Patient performing self cares and is able to care for infant.  Action: Patient denied pain during shift. Patient education done and discharge paperwork reviewed. See flow record.  Response: Positive attachment behaviors observed with infant. Support person present at bedside. Questions/concerns addressed.  Plan: Pt discharged home at 1255.       Problem: Adult Inpatient Plan of Care  Goal: Plan of Care Review  Description: The Plan of Care Review/Shift note should be completed every shift.  The Outcome Evaluation is a brief statement about your assessment that the patient is improving, declining, or no change.  This information will be displayed automatically on your shift  note.  Outcome: Met  Goal: Patient-Specific Goal (Individualized)  Description: You can add care plan individualizations to a care plan. Examples of Individualization might be:  \"Parent requests to be called daily at 9am for status\", \"I have a hard time hearing out of my right ear\", or \"Do not touch me to wake me up as it startles  me\".  Outcome: Met  Goal: Absence of Hospital-Acquired Illness or Injury  Outcome: Met  Intervention: Prevent Skin Injury  Recent Flowsheet Documentation  Taken 7/16/2024 1001 by Cecilia Ha RN  Body Position: position changed independently  Goal: Optimal Comfort and Wellbeing  Outcome: Met  Intervention: Provide Person-Centered Care  Recent Flowsheet Documentation  Taken 7/16/2024 1001 by Cecilia Ha RN  Trust Relationship/Rapport:   care explained   choices provided   questions answered   questions encouraged  Goal: Readiness for Transition of Care  Recent Flowsheet Documentation  Taken 7/16/2024 1100 by Cecilia Ha RN  Anticipated Changes Related " to Illness: none  Concerns to be Addressed: all concerns addressed in this encounter  7/16/2024 1057 by Cecilia Ha RN  Outcome: Met  Flowsheets (Taken 7/16/2024 1001)  Concerns to be Addressed: all concerns addressed in this encounter  Intervention: Mutually Develop Transition Plan  Recent Flowsheet Documentation  Taken 7/16/2024 1100 by Cecilia Ha RN  Anticipated Changes Related to Illness: none  Concerns to be Addressed: all concerns addressed in this encounter  Taken 7/16/2024 1001 by Cecilia Ha RN  Concerns to be Addressed: all concerns addressed in this encounter  Patient/Family Anticipates Transition to: home  Equipment Currently Used at Home: none     Problem: Postpartum (Vaginal Delivery)  Goal: Successful Parent Role Transition  Outcome: Met  Goal: Hemostasis  Outcome: Met  Goal: Absence of Infection Signs and Symptoms  Outcome: Met  Goal: Anesthesia/Sedation Recovery  Outcome: Met  Goal: Optimal Pain Control and Function  Outcome: Met  Goal: Effective Urinary Elimination  Outcome: Met

## 2024-07-16 NOTE — PROGRESS NOTES
Patient Name:  Haley Jacob   MRN:  [id number]  Age:  33 year old    YOB: 1990      POSTPARTUM PROGRESS NOTE    Pt is PPD#2 s/p vaginal delivery. Doing well this morning, no complaints. Ambulating, voiding spontaneously, tolerating PO without nausea/vomiting. Feeding is going well. Feels ready for discharge home    Objective:    Temp:  [98  F (36.7  C)-98.6  F (37  C)] 98.1  F (36.7  C)  Pulse:  [76-85] 76  Resp:  [16-18] 18  BP: (102-123)/(54-62) 109/58  185 lbs 0 oz    General Appearance:  NAD  Lungs:  unlabored  Cardiovascular:  RRR  Abdomen:  nontender, nondistended  Fundus:  firm, below the umbilicus      Lab Review:    ABO/RH(D)   Date Value Ref Range Status   2024 A POS  Final     Hemoglobin   Date Value Ref Range Status   2024 12.7 11.7 - 15.7 g/dL Final   2024 12.0 11.7 - 15.7 g/dL Final     Hematocrit   Date Value Ref Range Status   2024 39.8 35.0 - 47.0 % Final   2024 36.6 35.0 - 47.0 % Final     Assessment: 34yo  PPD#2 s/p precipitous vaginal delivery, doing well.    Plan:   - Postpartum: recovering well. Pain well controlled. Cont PO pain meds and regular diet. Encourage ambulation.  - Contraception: undecided  - Dispo: anticipate DC today    MD Mariela Maloneyet OBN  [phone]  2024 7:43 AM

## 2024-09-18 ENCOUNTER — MEDICAL CORRESPONDENCE (OUTPATIENT)
Dept: HEALTH INFORMATION MANAGEMENT | Facility: CLINIC | Age: 34
End: 2024-09-18
Payer: COMMERCIAL

## 2024-10-05 ENCOUNTER — HEALTH MAINTENANCE LETTER (OUTPATIENT)
Age: 34
End: 2024-10-05